# Patient Record
Sex: MALE | Race: WHITE | NOT HISPANIC OR LATINO | Employment: OTHER | URBAN - METROPOLITAN AREA
[De-identification: names, ages, dates, MRNs, and addresses within clinical notes are randomized per-mention and may not be internally consistent; named-entity substitution may affect disease eponyms.]

---

## 2023-04-25 LAB
ALANINE AMINOTRANSFERASE (SGPT) (U/L) IN SER/PLAS: 22 U/L (ref 10–52)
ANION GAP IN SER/PLAS: 12 MMOL/L (ref 10–20)
CALCIUM (MG/DL) IN SER/PLAS: 10.2 MG/DL (ref 8.6–10.3)
CARBON DIOXIDE, TOTAL (MMOL/L) IN SER/PLAS: 30 MMOL/L (ref 21–32)
CHLORIDE (MMOL/L) IN SER/PLAS: 101 MMOL/L (ref 98–107)
CHOLESTEROL (MG/DL) IN SER/PLAS: 152 MG/DL (ref 0–199)
CHOLESTEROL IN HDL (MG/DL) IN SER/PLAS: 52.3 MG/DL
CHOLESTEROL/HDL RATIO: 2.9
CREATININE (MG/DL) IN SER/PLAS: 0.85 MG/DL (ref 0.5–1.3)
GFR MALE: >90 ML/MIN/1.73M2
GLUCOSE (MG/DL) IN SER/PLAS: 86 MG/DL (ref 74–99)
LDL: 80 MG/DL (ref 0–99)
POTASSIUM (MMOL/L) IN SER/PLAS: 4.4 MMOL/L (ref 3.5–5.3)
PROSTATE SPECIFIC AG (NG/ML) IN SER/PLAS: 6.09 NG/ML (ref 0–4)
SODIUM (MMOL/L) IN SER/PLAS: 139 MMOL/L (ref 136–145)
THYROTROPIN (MIU/L) IN SER/PLAS BY DETECTION LIMIT <= 0.05 MIU/L: 2.35 MIU/L (ref 0.44–3.98)
TRIGLYCERIDE (MG/DL) IN SER/PLAS: 97 MG/DL (ref 0–149)
UREA NITROGEN (MG/DL) IN SER/PLAS: 15 MG/DL (ref 6–23)
VLDL: 19 MG/DL (ref 0–40)

## 2023-04-27 NOTE — RESULT ENCOUNTER NOTE
Please call- Please let him know his labs all look stable.  PSA again a bit bumped- but improved a bit from last time.  Please be sure to keep upcoming appointment with Dr. Ahmadi.

## 2023-05-01 PROBLEM — R39.11 HESITANCY OF MICTURITION: Status: ACTIVE | Noted: 2023-05-01

## 2023-05-01 PROBLEM — R39.12 WEAK URINARY STREAM: Status: ACTIVE | Noted: 2023-05-01

## 2023-05-01 PROBLEM — M18.9 OSTEOARTHRITIS OF FIRST CARPOMETACARPAL JOINT, UNSPECIFIED: Status: ACTIVE | Noted: 2023-05-01

## 2023-05-01 PROBLEM — J30.1 ALLERGIC RHINITIS DUE TO POLLEN: Status: ACTIVE | Noted: 2023-05-01

## 2023-05-01 PROBLEM — E78.5 DYSLIPIDEMIA: Status: ACTIVE | Noted: 2023-05-01

## 2023-05-01 PROBLEM — G25.0 TREMOR, ESSENTIAL: Status: ACTIVE | Noted: 2023-05-01

## 2023-05-01 PROBLEM — K57.30 DIVERTICULOSIS OF COLON: Status: ACTIVE | Noted: 2023-05-01

## 2023-05-01 PROBLEM — R97.20 ELEVATED PSA: Status: ACTIVE | Noted: 2023-05-01

## 2023-05-01 PROBLEM — N40.0 BENIGN PROSTATIC HYPERPLASIA, PRESENCE OF LOWER URINARY TRACT SYMPTOMS UNSPECIFIED: Status: ACTIVE | Noted: 2023-05-01

## 2023-05-01 PROBLEM — R03.0 BLOOD PRESSURE ELEVATED WITHOUT HISTORY OF HTN: Status: ACTIVE | Noted: 2023-05-01

## 2023-05-01 PROBLEM — Z97.3 WEARS GLASSES: Status: ACTIVE | Noted: 2023-05-01

## 2023-05-01 RX ORDER — TAMSULOSIN HYDROCHLORIDE 0.4 MG/1
2 CAPSULE ORAL NIGHTLY
COMMUNITY
Start: 2018-11-12 | End: 2023-08-17

## 2023-05-01 RX ORDER — ATORVASTATIN CALCIUM 10 MG/1
TABLET, FILM COATED ORAL
COMMUNITY
Start: 2017-06-16 | End: 2023-08-17

## 2023-05-01 RX ORDER — MINERAL OIL
ENEMA (ML) RECTAL
COMMUNITY
Start: 2017-01-16

## 2023-05-01 RX ORDER — AZELASTINE HCL 205.5 UG/1
SPRAY NASAL
COMMUNITY
Start: 2017-10-06 | End: 2024-05-20 | Stop reason: WASHOUT

## 2023-05-01 RX ORDER — FLUTICASONE FUROATE 27.5 UG/1
SPRAY, METERED NASAL
COMMUNITY
Start: 2019-03-25

## 2023-05-01 RX ORDER — CHOLECALCIFEROL (VITAMIN D3) 25 MCG
1 TABLET,CHEWABLE ORAL AS NEEDED
COMMUNITY
Start: 2016-05-19

## 2023-05-05 ENCOUNTER — OFFICE VISIT (OUTPATIENT)
Dept: PRIMARY CARE | Facility: CLINIC | Age: 73
End: 2023-05-05
Payer: MEDICARE

## 2023-05-05 VITALS
TEMPERATURE: 98.1 F | SYSTOLIC BLOOD PRESSURE: 124 MMHG | RESPIRATION RATE: 16 BRPM | BODY MASS INDEX: 30.37 KG/M2 | DIASTOLIC BLOOD PRESSURE: 84 MMHG | WEIGHT: 196.8 LBS | OXYGEN SATURATION: 95 % | HEART RATE: 64 BPM

## 2023-05-05 DIAGNOSIS — E78.5 DYSLIPIDEMIA, GOAL LDL BELOW 100: Chronic | ICD-10-CM

## 2023-05-05 DIAGNOSIS — M17.0 ARTHRITIS OF BOTH KNEES: Chronic | ICD-10-CM

## 2023-05-05 DIAGNOSIS — R03.0 BLOOD PRESSURE ELEVATED WITHOUT HISTORY OF HTN: Chronic | ICD-10-CM

## 2023-05-05 DIAGNOSIS — E66.9 CLASS 1 OBESITY WITH SERIOUS COMORBIDITY AND BODY MASS INDEX (BMI) OF 30.0 TO 30.9 IN ADULT, UNSPECIFIED OBESITY TYPE: Chronic | ICD-10-CM

## 2023-05-05 DIAGNOSIS — M46.90 INFLAMMATORY SPONDYLOPATHY, UNSPECIFIED SPINAL REGION (CMS-HCC): Primary | Chronic | ICD-10-CM

## 2023-05-05 DIAGNOSIS — N40.0 BENIGN PROSTATIC HYPERPLASIA, PRESENCE OF LOWER URINARY TRACT SYMPTOMS UNSPECIFIED: Chronic | ICD-10-CM

## 2023-05-05 DIAGNOSIS — N52.9 IMPOTENCE OF ORGANIC ORIGIN: Chronic | ICD-10-CM

## 2023-05-05 DIAGNOSIS — Z71.85 IMMUNIZATION COUNSELING: Chronic | ICD-10-CM

## 2023-05-05 PROCEDURE — 1036F TOBACCO NON-USER: CPT | Performed by: INTERNAL MEDICINE

## 2023-05-05 PROCEDURE — 3008F BODY MASS INDEX DOCD: CPT | Performed by: INTERNAL MEDICINE

## 2023-05-05 PROCEDURE — 99214 OFFICE O/P EST MOD 30 MIN: CPT | Performed by: INTERNAL MEDICINE

## 2023-05-05 PROCEDURE — 1160F RVW MEDS BY RX/DR IN RCRD: CPT | Performed by: INTERNAL MEDICINE

## 2023-05-05 PROCEDURE — 1159F MED LIST DOCD IN RCRD: CPT | Performed by: INTERNAL MEDICINE

## 2023-05-05 RX ORDER — ACETAMINOPHEN 500 MG
500 TABLET ORAL
COMMUNITY
End: 2024-06-10 | Stop reason: HOSPADM

## 2023-05-05 RX ORDER — LORATADINE 10 MG/1
10 TABLET ORAL
COMMUNITY
Start: 2011-08-24 | End: 2023-10-11 | Stop reason: ALTCHOICE

## 2023-05-05 RX ORDER — KETOTIFEN FUMARATE 0.35 MG/ML
1 SOLUTION/ DROPS OPHTHALMIC 2 TIMES DAILY
COMMUNITY
Start: 2011-08-24 | End: 2023-05-05 | Stop reason: ALTCHOICE

## 2023-05-05 RX ORDER — GLUC/MSM/COLGN2/HYAL/ANTIARTH3 375-375-20
1 TABLET ORAL DAILY
COMMUNITY
Start: 2011-06-24

## 2023-05-05 ASSESSMENT — PATIENT HEALTH QUESTIONNAIRE - PHQ9
SUM OF ALL RESPONSES TO PHQ9 QUESTIONS 1 AND 2: 0
2. FEELING DOWN, DEPRESSED OR HOPELESS: NOT AT ALL
1. LITTLE INTEREST OR PLEASURE IN DOING THINGS: NOT AT ALL

## 2023-05-05 ASSESSMENT — ENCOUNTER SYMPTOMS
DEPRESSION: 0
LOSS OF SENSATION IN FEET: 0
OCCASIONAL FEELINGS OF UNSTEADINESS: 0

## 2023-05-05 NOTE — PROGRESS NOTES
Subjective   Patient ID: Robe Fernandez is a 72 y.o. male who presents for Follow-up.    Here for follow up  Just did labs.       Review of Systems    Objective   /84   Pulse 64   Temp 36.7 °C (98.1 °F)   Resp 16   Wt 89.3 kg (196 lb 12.8 oz)   SpO2 95%   BMI 30.37 kg/m²     Physical Exam  Vitals reviewed.   Constitutional:       Appearance: Normal appearance.   HENT:      Head: Normocephalic and atraumatic.   Eyes:      General: No scleral icterus.        Right eye: No discharge.         Left eye: No discharge.      Extraocular Movements: Extraocular movements intact.      Conjunctiva/sclera: Conjunctivae normal.      Pupils: Pupils are equal, round, and reactive to light.   Cardiovascular:      Rate and Rhythm: Normal rate and regular rhythm.      Pulses: Normal pulses.      Heart sounds: Normal heart sounds. No murmur heard.  Pulmonary:      Effort: Pulmonary effort is normal.      Breath sounds: Normal breath sounds. No wheezing or rhonchi.   Musculoskeletal:         General: No deformity or signs of injury. Normal range of motion.      Cervical back: Normal range of motion and neck supple. No rigidity or tenderness.   Lymphadenopathy:      Cervical: No cervical adenopathy.   Skin:     General: Skin is warm and dry.      Findings: No rash.   Neurological:      General: No focal deficit present.      Mental Status: He is alert and oriented to person, place, and time. Mental status is at baseline.      Cranial Nerves: No cranial nerve deficit.      Sensory: No sensory deficit.      Gait: Gait normal.   Psychiatric:         Mood and Affect: Mood normal.         Behavior: Behavior normal.         Thought Content: Thought content normal.         Judgment: Judgment normal.       Assessment/Plan   Problem List Items Addressed This Visit          Circulatory    Blood pressure elevated without history of HTN    Relevant Orders    CT cardiac scoring wo IV contrast       Genitourinary    Benign prostatic  hyperplasia, presence of lower urinary tract symptoms unspecified    Impotence of organic origin       Musculoskeletal    Inflammatory spondylopathy, unspecified spinal region (CMS/HCC) - Primary    Arthritis of both knees       Other    Dyslipidemia, goal LDL below 100    Relevant Orders    CT cardiac scoring wo IV contrast     Other Visit Diagnoses       Immunization counseling  (Chronic)       Relevant Medications    zoster vaccine-recombinant adjuvanted (Shingrix) 50 mcg/0.5 mL vaccine    zoster vaccine-recombinant adjuvanted (Shingrix) 50 mcg/0.5 mL vaccine    diphth,pertus,acell,,tetanus (BoostRIX) 2.5-8-5 Lf-mcg-Lf/0.5mL injection    Class 1 obesity with serious comorbidity and body mass index (BMI) of 30.0 to 30.9 in adult, unspecified obesity type  (Chronic)                    Borderline elevated blood pressure- diastolic blood pressure improved on recheck     Dyslipidemia/elevated weight recent lipids reviewed. Elevated 10 year cardiac risk assessment 12% in June 2017- he will continue a baby aspirin daily and atorvastatin and reassess before follow-up. He understands the need for long-term weight loss efforts.             Goal LDL under 100.  LDL April 2023 53.  Discussed further cardiac screening evaluation-suggested calcium score 5/23      Exercise routine-he understands the importance of regular exercise    History of Dizziness-this occurs when he does 1 machine with his  twice weekly with weight training. I encouraged him not to use equipment that causes dizziness   Not actively problematic presently     Seasonal allergies/ recurrent sinus issues- mainly spring issue- better now; saw [facility] ENT MD, and sinus polyp surgery not deemed necessary now. Saw Dr. Whitman - who started shots mainly for molds & weeds- now weekly. He'll work a plan for winter in [place]. He will get back with her accordingly and continue medications              So far doing fairly well this spring despite the heavy pollen  counts in [place]. Flonase Sensimist helpful     Colon polyps / History of occasional Loose bowel movements over the last week/history of diverticulosis/family history of colon cancer - father / external hemorrhoids-he will continue his probiotic and healthy diet choices    colonoscopy updated June '21; next in 5 yrs - June 2026    Ext. Hemorrhoids - he'll cont. Fluids, fiber and desitin prn     Elevated PSA / BPH / family Hx prostate cancer - both brothers-           [place] urology evaluation included MRI which was negative.  12/27/21.   prostate surgery was planned late July 2022- HoLEP with Dr. Perez- but elected to defer surgery            April 2023 PSA elevated 6.09.  Suggested he establish with either his [place] urologist, Dr. Perez, or his [place] urologist, determine if or whether further evaluation needs to be considered with his strong family history of prostate cancer in both brothers.     Intention tremor - occas right hand w/ tremor w/ writing. He'll follow     Right lateral epicondylitis- information provided, review two-page handout, he will do stretching and use the Velcro elbow brace and follow-up with concerns. Not problematic     Bilateral CMC DJD- he will continue Tylenol or ibuprofen, caution with overactivity and follow-up with his orthopedist at the [facility] as needed; x-ray suggested mild issue, but clinically occas sore; doing well     History of bilateral partial knee replacement-stable symptoms presently. He will see his orthopedist- Dr. Aaron / [facility] as needed. Caution with overactivity and again Tylenol and/or ibuprofen as needed. He also uses glucosamine. So far doing well     Right hip pain-occasionally noted this does not really cause any issues            Mainly flares w/ more frequent exercise or hiking     Metatarsalgia- he has rather high arches and sometimes has pain along his lateral feet. He will continue to optimize shoes accordingly        Skin care-he understands importance of  sunscreen and dermatology follow-up with concerns. Presently on doxycycline for presumed rosacea. Visits annually. Several spots removed.     Dental care-encouraged semiannual dental visits- UTD     Glasses / Vision care-he continues regularly- He will set this up soon.            Cataracts advancing. He'll cont. Each spring here in [place]        Discussed Shingrix / new shingles shot - 2 shot series w/ limited availability. Encouraged patient to consider getting this when/ where available.      Flu shot encouraged each fall     Follow-up in 6 months, sooner as needed- after returning from [place]

## 2023-08-16 DIAGNOSIS — N40.0 BENIGN PROSTATIC HYPERPLASIA, PRESENCE OF LOWER URINARY TRACT SYMPTOMS UNSPECIFIED: Primary | ICD-10-CM

## 2023-08-16 DIAGNOSIS — E78.5 DYSLIPIDEMIA, GOAL LDL BELOW 100: ICD-10-CM

## 2023-08-17 RX ORDER — ATORVASTATIN CALCIUM 10 MG/1
10 TABLET, FILM COATED ORAL DAILY
Qty: 90 TABLET | Refills: 3 | Status: SHIPPED | OUTPATIENT
Start: 2023-08-17 | End: 2023-10-11 | Stop reason: SDUPTHER

## 2023-08-17 RX ORDER — TAMSULOSIN HYDROCHLORIDE 0.4 MG/1
0.8 CAPSULE ORAL NIGHTLY
Qty: 180 CAPSULE | Refills: 3 | Status: SHIPPED | OUTPATIENT
Start: 2023-08-17 | End: 2024-06-10 | Stop reason: HOSPADM

## 2023-08-30 LAB — PROSTATE SPECIFIC AG (NG/ML) IN SER/PLAS: 5.22 NG/ML (ref 0–4)

## 2023-08-31 ENCOUNTER — TELEPHONE (OUTPATIENT)
Dept: PRIMARY CARE | Facility: CLINIC | Age: 73
End: 2023-08-31
Payer: MEDICARE

## 2023-08-31 DIAGNOSIS — R93.1 AGATSTON CORONARY ARTERY CALCIUM SCORE GREATER THAN 400: Primary | ICD-10-CM

## 2023-08-31 NOTE — TELEPHONE ENCOUNTER
Called and spoke with patient regarding his elevated calcium score at 1673.  He will set up an appointment with Dr. Perez in cardiology.    He has his blood pressure has been better at home in the 120 range systolic.  We will continue to follow this and review this with Dr. Perez

## 2023-08-31 NOTE — RESULT ENCOUNTER NOTE
Robe-thanks for doing the calcium score.  I am concerned that the calcium score is elevated.  I we will plan to call you this afternoon as we will need to get you scheduled to see the cardiologist to look into this matter in further detail.    Thanks again for doing this important cardiac test.    Sincerely,  Berto Ahmadi MD

## 2023-10-11 ENCOUNTER — OFFICE VISIT (OUTPATIENT)
Dept: CARDIOLOGY | Facility: CLINIC | Age: 73
End: 2023-10-11
Payer: MEDICARE

## 2023-10-11 VITALS
HEART RATE: 93 BPM | WEIGHT: 189 LBS | SYSTOLIC BLOOD PRESSURE: 136 MMHG | OXYGEN SATURATION: 93 % | BODY MASS INDEX: 27.06 KG/M2 | HEIGHT: 70 IN | DIASTOLIC BLOOD PRESSURE: 90 MMHG

## 2023-10-11 DIAGNOSIS — R03.0 BLOOD PRESSURE ELEVATED WITHOUT HISTORY OF HTN: ICD-10-CM

## 2023-10-11 DIAGNOSIS — E78.5 DYSLIPIDEMIA, GOAL LDL BELOW 100: Primary | ICD-10-CM

## 2023-10-11 DIAGNOSIS — R93.1 AGATSTON CORONARY ARTERY CALCIUM SCORE GREATER THAN 400: ICD-10-CM

## 2023-10-11 PROCEDURE — 93005 ELECTROCARDIOGRAM TRACING: CPT | Mod: PO | Performed by: INTERNAL MEDICINE

## 2023-10-11 PROCEDURE — 99214 OFFICE O/P EST MOD 30 MIN: CPT | Mod: PO | Performed by: INTERNAL MEDICINE

## 2023-10-11 PROCEDURE — 99204 OFFICE O/P NEW MOD 45 MIN: CPT | Performed by: INTERNAL MEDICINE

## 2023-10-11 PROCEDURE — 1159F MED LIST DOCD IN RCRD: CPT | Performed by: INTERNAL MEDICINE

## 2023-10-11 PROCEDURE — 1126F AMNT PAIN NOTED NONE PRSNT: CPT | Performed by: INTERNAL MEDICINE

## 2023-10-11 PROCEDURE — 1160F RVW MEDS BY RX/DR IN RCRD: CPT | Performed by: INTERNAL MEDICINE

## 2023-10-11 PROCEDURE — 3008F BODY MASS INDEX DOCD: CPT | Performed by: INTERNAL MEDICINE

## 2023-10-11 PROCEDURE — 1036F TOBACCO NON-USER: CPT | Performed by: INTERNAL MEDICINE

## 2023-10-11 RX ORDER — ATORVASTATIN CALCIUM 10 MG/1
40 TABLET, FILM COATED ORAL NIGHTLY
Qty: 360 TABLET | Refills: 3 | Status: SHIPPED | OUTPATIENT
Start: 2023-10-11 | End: 2023-10-12 | Stop reason: SDUPTHER

## 2023-10-11 ASSESSMENT — PAIN SCALES - GENERAL: PAINLEVEL: 0-NO PAIN

## 2023-10-11 NOTE — PROGRESS NOTES
Name : Robe Fernandez    : 1950   MRN : [id number]   ENC Date : 10/11/23     Reason for visit: Abnormal coronary calcium score    Assessment and Plan:  Elevated coronary calcium score: Coronary calcium score is quite elevated and places him in the high risk category for cardiovascular events over the next decade.  He is currently completely asymptomatic exercising multiple times per week.  Therefore I do not think stress testing is warranted.  I recommended increasing his atorvastatin to 40 mg daily.  We had a long discussion regarding utility of aspirin for primary prevention.  I think given his family history and the significantly elevated coronary calcium score I would recommend aspirin 81 mg daily.  He is going to think about this and determine whether he wants to started or not.  If his symptoms change in the future then certainly moving ahead with stress testing would be warranted.  Family history abdominal aortic aneurysm: Father.  Passed away from a ruptured abdominal aortic aneurysm.  This was not confirmed by autopsy.  However the story is good enough to warrant screening for abdominal aortic aneurysm.  We will schedule this and call him with the result.  Elevated blood pressure at the time of office visits well controlled at home: Patient does a good job of checking his blood pressure at home.  His diastolic is high normal today but his systolic is reasonable.  Continue to clinically follow.  Dyslipidemia: As above  Disp: Phone follow-up with abdominal aortic aneurysm screening ultrasound test.  If normal can return as needed      HPI:  Patient is seen today for evaluation of abnormal coronary calcium score.  His coronary calcium score is greater than 1400.  Patient is asymptomatic.  He exercises 2-3 times per week with no chest pain tightness pressure or squeezing.  He has mild dyspnea at peak exertion but has no other dyspnea with normal activities.  No syncopal events.  No TIA or CVA-like symptoms.   He has a family history of a father who had a TIA and ultimately a stroke as well.  He likely however passed away from a ruptured abdominal aortic aneurysm.  This was not confirmed by autopsy but this was clinically felt to be the most likely cause of his death.  We will discuss      Problem List:   Patient Active Problem List   Diagnosis    Allergic rhinitis due to pollen    Benign prostatic hyperplasia, presence of lower urinary tract symptoms unspecified    Blood pressure elevated without history of HTN    Diverticulosis of colon    Dyslipidemia, goal LDL below 100    Elevated PSA    Hesitancy of micturition    Osteoarthritis of first carpometacarpal joint, unspecified    Tremor, essential    Weak urinary stream    Wears glasses    Inflammatory spondylopathy, unspecified spinal region (CMS/HCC)    Arthritis of both knees    Impotence of organic origin    Agatston coronary artery calcium score greater than 400        Meds:   Current Outpatient Medications on File Prior to Visit   Medication Sig Dispense Refill    acetaminophen (Tylenol) 500 mg tablet Take 1 tablet (500 mg) by mouth.      atorvastatin (Lipitor) 10 mg tablet Take 1 tablet (10 mg) by mouth once daily. FOR HIGH CHOLESTEROL 90 tablet 3    azelastine 205.5 mcg (0.15 %) spray,non-aerosol Administer into affected nostril(s).      fexofenadine (Allegra) 180 mg tablet Take by mouth.      fluticasone (Flonase Sensimist) 27.5 mcg/actuation nasal spray Administer into affected nostril(s).      glucosamine sanchez 2KCl-chondroit 500-400 mg tablet Take 1 tablet by mouth 3 times a day.      Lactobacillus acidophilus 500 million cell tablet Take by mouth.      tamsulosin (Flomax) 0.4 mg 24 hr capsule Take 2 capsules (0.8 mg) by mouth once daily at bedtime. 180 capsule 3    vitamins A,C,E-zinc-copper (ICaps AREDS) 7,160-113-100 unit-mg-unit tablet,delayed release (DR/EC) Take by mouth.      [DISCONTINUED] loratadine (Claritin) 10 mg tablet Take 1 tablet (10 mg) by mouth  "once daily.      [DISCONTINUED] zoster vaccine-recombinant adjuvanted (Shingrix) 50 mcg/0.5 mL vaccine Give 1 injection, 0.5 ml IM now, and repeat in 8 weeks 0.5 mL 1    [DISCONTINUED] zoster vaccine-recombinant adjuvanted (Shingrix) 50 mcg/0.5 mL vaccine Give 1 injection, 0.5 ml IM now, and repeat in 8 weeks 0.5 mL 1     No current facility-administered medications on file prior to visit.       All: No Known Allergies    Fam Hx:   Family History   Problem Relation Name Age of Onset    Transient ischemic attack Father      Other (cva) Father      Aortic aneurysm Father         Soc Hx:   Social History     Socioeconomic History    Marital status: Significant Other     Spouse name: Not on file    Number of children: Not on file    Years of education: Not on file    Highest education level: Not on file   Occupational History    Not on file   Tobacco Use    Smoking status: Never    Smokeless tobacco: Never   Vaping Use    Vaping Use: Never used   Substance and Sexual Activity    Alcohol use: Yes     Comment: Rarely    Drug use: Never    Sexual activity: Not on file   Other Topics Concern    Not on file   Social History Narrative    Not on file     Social Determinants of Health     Financial Resource Strain: Not on file   Food Insecurity: Not on file   Transportation Needs: Not on file   Physical Activity: Not on file   Stress: Not on file   Social Connections: Not on file   Intimate Partner Violence: Not on file   Housing Stability: Not on file       ROS    VS: /90 (BP Location: Right arm, Patient Position: Sitting, BP Cuff Size: Adult)   Pulse 93   Ht 1.778 m (5' 10\")   Wt 85.7 kg (189 lb)   SpO2 93%   BMI 27.12 kg/m²      Physical Exam  Vitals reviewed.   Constitutional:       Appearance: Normal appearance.   Eyes:      Pupils: Pupils are equal, round, and reactive to light.   Neck:      Vascular: No JVD.   Cardiovascular:      Rate and Rhythm: Normal rate and regular rhythm.      Pulses: Normal pulses.     "  Heart sounds: No murmur heard.     No gallop.   Pulmonary:      Effort: No respiratory distress.      Breath sounds: No wheezing or rales.   Abdominal:      General: Abdomen is flat. There is no distension.      Palpations: Abdomen is soft.   Musculoskeletal:         General: No swelling.      Right lower leg: No edema.      Left lower leg: No edema.   Neurological:      General: No focal deficit present.      Mental Status: He is alert.   Psychiatric:         Mood and Affect: Mood normal.              Jun Perez MD

## 2023-10-12 ENCOUNTER — TELEPHONE (OUTPATIENT)
Dept: CARDIOLOGY | Facility: CLINIC | Age: 73
End: 2023-10-12
Payer: MEDICARE

## 2023-10-12 DIAGNOSIS — E78.5 DYSLIPIDEMIA, GOAL LDL BELOW 100: ICD-10-CM

## 2023-10-12 LAB
ATRIAL RATE: 86 BPM
P AXIS: 49 DEGREES
P OFFSET: 201 MS
P ONSET: 142 MS
PR INTERVAL: 162 MS
Q ONSET: 223 MS
QRS COUNT: 14 BEATS
QRS DURATION: 96 MS
QT INTERVAL: 330 MS
QTC CALCULATION(BAZETT): 394 MS
QTC FREDERICIA: 372 MS
R AXIS: 27 DEGREES
T AXIS: 69 DEGREES
T OFFSET: 388 MS
VENTRICULAR RATE: 86 BPM

## 2023-10-12 PROCEDURE — 93010 ELECTROCARDIOGRAM REPORT: CPT | Performed by: INTERNAL MEDICINE

## 2023-10-12 RX ORDER — ATORVASTATIN CALCIUM 40 MG/1
40 TABLET, FILM COATED ORAL NIGHTLY
Qty: 90 TABLET | Refills: 3 | Status: SHIPPED | OUTPATIENT
Start: 2023-10-12 | End: 2024-10-11

## 2023-10-12 NOTE — TELEPHONE ENCOUNTER
PT needs an auth on his [employer], He sd you had it incr' to 40mg, and he also noticed that it was @10mg. Please advise

## 2023-10-31 ENCOUNTER — HOSPITAL ENCOUNTER (OUTPATIENT)
Dept: VASCULAR MEDICINE | Facility: CLINIC | Age: 73
Discharge: HOME | End: 2023-10-31
Payer: MEDICARE

## 2023-10-31 DIAGNOSIS — R93.1 AGATSTON CORONARY ARTERY CALCIUM SCORE GREATER THAN 400: ICD-10-CM

## 2023-10-31 DIAGNOSIS — Z13.6 ENCOUNTER FOR SCREENING FOR CARDIOVASCULAR DISORDERS: ICD-10-CM

## 2023-10-31 DIAGNOSIS — E78.5 DYSLIPIDEMIA, GOAL LDL BELOW 100: ICD-10-CM

## 2023-10-31 DIAGNOSIS — R03.0 BLOOD PRESSURE ELEVATED WITHOUT HISTORY OF HTN: ICD-10-CM

## 2023-10-31 PROCEDURE — 76706 US ABDL AORTA SCREEN AAA: CPT

## 2023-10-31 PROCEDURE — 76706 US ABDL AORTA SCREEN AAA: CPT | Performed by: SURGERY

## 2023-11-02 ENCOUNTER — TELEPHONE (OUTPATIENT)
Dept: CARDIOLOGY | Facility: HOSPITAL | Age: 73
End: 2023-11-02

## 2023-11-02 NOTE — TELEPHONE ENCOUNTER
Please let patient know his abdominal ultrasound shows no evidence of abdominal aortic aneurysm.  No further screening is needed.    SDH

## 2023-11-07 ENCOUNTER — ANCILLARY PROCEDURE (OUTPATIENT)
Dept: RADIOLOGY | Facility: CLINIC | Age: 73
End: 2023-11-07
Payer: MEDICARE

## 2023-11-07 ENCOUNTER — TELEPHONE (OUTPATIENT)
Dept: PRIMARY CARE | Facility: CLINIC | Age: 73
End: 2023-11-07
Payer: MEDICARE

## 2023-11-07 DIAGNOSIS — M54.30 SCIATIC NERVE PAIN, UNSPECIFIED LATERALITY: ICD-10-CM

## 2023-11-07 DIAGNOSIS — M54.30 SCIATIC NERVE PAIN, UNSPECIFIED LATERALITY: Primary | ICD-10-CM

## 2023-11-07 PROCEDURE — 72100 X-RAY EXAM L-S SPINE 2/3 VWS: CPT | Performed by: STUDENT IN AN ORGANIZED HEALTH CARE EDUCATION/TRAINING PROGRAM

## 2023-11-07 PROCEDURE — 72100 X-RAY EXAM L-S SPINE 2/3 VWS: CPT

## 2023-11-07 NOTE — TELEPHONE ENCOUNTER
Patient was seen in ER for back pain now has sciatica is on a steroid asking if there is something else that can be done ? Has appointment tomorrow for physical.    Please call [phone]

## 2023-11-08 ENCOUNTER — OFFICE VISIT (OUTPATIENT)
Dept: PRIMARY CARE | Facility: CLINIC | Age: 73
End: 2023-11-08
Payer: MEDICARE

## 2023-11-08 VITALS
RESPIRATION RATE: 16 BRPM | DIASTOLIC BLOOD PRESSURE: 85 MMHG | SYSTOLIC BLOOD PRESSURE: 128 MMHG | HEIGHT: 70 IN | OXYGEN SATURATION: 94 % | TEMPERATURE: 97.9 F | HEART RATE: 89 BPM | BODY MASS INDEX: 26.63 KG/M2 | WEIGHT: 186 LBS

## 2023-11-08 DIAGNOSIS — M53.3 CHRONIC RIGHT SI JOINT PAIN: ICD-10-CM

## 2023-11-08 DIAGNOSIS — Z00.00 ROUTINE GENERAL MEDICAL EXAMINATION AT HEALTH CARE FACILITY: Primary | ICD-10-CM

## 2023-11-08 DIAGNOSIS — Z23 NEED FOR INFLUENZA VACCINATION: ICD-10-CM

## 2023-11-08 DIAGNOSIS — Z71.85 IMMUNIZATION COUNSELING: ICD-10-CM

## 2023-11-08 DIAGNOSIS — M54.31 SCIATICA OF RIGHT SIDE: ICD-10-CM

## 2023-11-08 DIAGNOSIS — G89.29 CHRONIC RIGHT SI JOINT PAIN: ICD-10-CM

## 2023-11-08 DIAGNOSIS — E78.5 DYSLIPIDEMIA, GOAL LDL BELOW 100: ICD-10-CM

## 2023-11-08 DIAGNOSIS — R03.0 BLOOD PRESSURE ELEVATED WITHOUT HISTORY OF HTN: ICD-10-CM

## 2023-11-08 PROCEDURE — 1036F TOBACCO NON-USER: CPT | Performed by: INTERNAL MEDICINE

## 2023-11-08 PROCEDURE — 1160F RVW MEDS BY RX/DR IN RCRD: CPT | Performed by: INTERNAL MEDICINE

## 2023-11-08 PROCEDURE — 1170F FXNL STATUS ASSESSED: CPT | Performed by: INTERNAL MEDICINE

## 2023-11-08 PROCEDURE — 1159F MED LIST DOCD IN RCRD: CPT | Performed by: INTERNAL MEDICINE

## 2023-11-08 PROCEDURE — G0008 ADMIN INFLUENZA VIRUS VAC: HCPCS | Performed by: INTERNAL MEDICINE

## 2023-11-08 PROCEDURE — 1126F AMNT PAIN NOTED NONE PRSNT: CPT | Performed by: INTERNAL MEDICINE

## 2023-11-08 PROCEDURE — 99214 OFFICE O/P EST MOD 30 MIN: CPT | Performed by: INTERNAL MEDICINE

## 2023-11-08 PROCEDURE — G0439 PPPS, SUBSEQ VISIT: HCPCS | Performed by: INTERNAL MEDICINE

## 2023-11-08 PROCEDURE — 90662 IIV NO PRSV INCREASED AG IM: CPT | Performed by: INTERNAL MEDICINE

## 2023-11-08 PROCEDURE — G0444 DEPRESSION SCREEN ANNUAL: HCPCS | Performed by: INTERNAL MEDICINE

## 2023-11-08 PROCEDURE — 3008F BODY MASS INDEX DOCD: CPT | Performed by: INTERNAL MEDICINE

## 2023-11-08 RX ORDER — CYCLOBENZAPRINE HCL 10 MG
TABLET ORAL
COMMUNITY
Start: 2023-11-04 | End: 2023-11-21 | Stop reason: SDUPTHER

## 2023-11-08 RX ORDER — METHYLPREDNISOLONE 4 MG/1
TABLET ORAL
COMMUNITY
Start: 2023-11-04 | End: 2023-11-21 | Stop reason: ALTCHOICE

## 2023-11-08 ASSESSMENT — ACTIVITIES OF DAILY LIVING (ADL)
TAKING_MEDICATION: INDEPENDENT
BATHING: INDEPENDENT
MANAGING_FINANCES: INDEPENDENT
DRESSING: INDEPENDENT
DOING_HOUSEWORK: INDEPENDENT
GROCERY_SHOPPING: INDEPENDENT

## 2023-11-08 ASSESSMENT — ENCOUNTER SYMPTOMS
OCCASIONAL FEELINGS OF UNSTEADINESS: 1
DEPRESSION: 0
LOSS OF SENSATION IN FEET: 0

## 2023-11-08 ASSESSMENT — PATIENT HEALTH QUESTIONNAIRE - PHQ9
1. LITTLE INTEREST OR PLEASURE IN DOING THINGS: NOT AT ALL
SUM OF ALL RESPONSES TO PHQ9 QUESTIONS 1 AND 2: 0
2. FEELING DOWN, DEPRESSED OR HOPELESS: NOT AT ALL

## 2023-11-08 NOTE — PROGRESS NOTES
"Subjective   Reason for Visit: Robe Fernandez is an 73 y.o. male here for a Medicare Wellness visit.     Past Medical, Surgical, and Family History reviewed and updated in chart.         Here for follow-up and wellness visit.  Overall doing fairly well.  However within the last week or so he has developed pain right lower back radiating down his right thigh towards his right knee.  No recent trauma no injury    Planning to travel to [place] for the winter after Thanksgiving    Right shoulder occasionally sore        Patient Care Team:  Berto Ahmadi MD as PCP - General  Berto Ahmadi MD as PCP - [facility] Attributed Provider     Review of Systems    Objective   Vitals:  /85 (BP Location: Left arm, Patient Position: Sitting, BP Cuff Size: Adult)   Pulse 89   Temp 36.6 °C (97.9 °F)   Resp 16   Ht 1.778 m (5' 10\")   Wt 84.4 kg (186 lb)   SpO2 94%   BMI 26.69 kg/m²       Physical Exam  Vitals reviewed.   Constitutional:       Appearance: Normal appearance.   HENT:      Head: Normocephalic and atraumatic.   Eyes:      General: No scleral icterus.        Right eye: No discharge.         Left eye: No discharge.      Extraocular Movements: Extraocular movements intact.      Conjunctiva/sclera: Conjunctivae normal.      Pupils: Pupils are equal, round, and reactive to light.   Cardiovascular:      Rate and Rhythm: Normal rate and regular rhythm.      Pulses: Normal pulses.      Heart sounds: Normal heart sounds. No murmur heard.  Pulmonary:      Effort: Pulmonary effort is normal.      Breath sounds: Normal breath sounds. No wheezing or rhonchi.   Musculoskeletal:         General: No deformity or signs of injury. Normal range of motion.      Cervical back: Normal range of motion and neck supple. No rigidity or tenderness.   Lymphadenopathy:      Cervical: No cervical adenopathy.   Skin:     General: Skin is warm and dry.      Findings: No rash.   Neurological:      General: No focal deficit present.      Mental " Status: He is alert and oriented to person, place, and time. Mental status is at baseline.      Cranial Nerves: No cranial nerve deficit.      Sensory: No sensory deficit.      Gait: Gait normal.   Psychiatric:         Mood and Affect: Mood normal.         Behavior: Behavior normal.         Thought Content: Thought content normal.         Judgment: Judgment normal.         Assessment/Plan   Problem List Items Addressed This Visit       Blood pressure elevated without history of HTN    Relevant Orders    Basic Metabolic Panel    Dyslipidemia, goal LDL below 100    Relevant Orders    TSH with reflex to Free T4 if abnormal    Lipid Panel    Alanine Aminotransferase     Other Visit Diagnoses       Routine general medical examination at health care facility    -  Primary    Relevant Orders    1 Year Follow Up In [facility] - PCP - Wellness Exam    Sciatica of right side        Relevant Orders    Referral to Physical Therapy    Need for influenza vaccination        Relevant Orders    Flu vaccine, quadrivalent, high-dose, preservative free, age 65y+ (FLUZONE) (Completed)    Immunization counseling        Relevant Medications    zoster vaccine-recombinant adjuvanted (Shingrix) 50 mcg/0.5 mL vaccine    Chronic right SI joint pain        Relevant Orders    Referral to Physical Therapy            Home situation-he remains independent, living with his  here in [place] during the summer, and spend the winter in [place]    Right lower lumbar pain/sciatica/right sacroiliac pain-fortunately no recent trauma but clearly uncomfortable.  He received a Medrol pack in urgent care over the weekend.  He will do x-rays and pursue physical therapy.  In the meantime he will try to sleep on his back with his knees elevated avoid other positions while sitting that exacerbates such as avoiding recliner.  He will do sacroiliac stretches as well as Becky exercises      Borderline elevated blood pressure- diastolic blood pressure  improved on recheck     Dyslipidemia/elevated weight recent lipids reviewed. Elevated 10 year cardiac risk assessment 12% in June 2017- he will continue a baby aspirin daily and atorvastatin and reassess before follow-up. He understands the need for long-term weight loss efforts.             Goal LDL under 100.  LDL April 2023 53.  Discussed further cardiac screening evaluation-suggested calcium score 5/23      Exercise routine-he understands the importance of regular exercise    History of Dizziness-this occurs when he does 1 machine with his  twice weekly with weight training. I encouraged him not to use equipment that causes dizziness   Not actively problematic presently     Seasonal allergies/ recurrent sinus issues- mainly spring issue- better now; saw [facility] ENT MD, and sinus polyp surgery not deemed necessary now. Saw Dr. Whitman - who started shots mainly for molds & weeds- now weekly. He'll work a plan for winter in [place]. He will get back with her accordingly and continue medications              So far doing fairly well this spring despite the heavy pollen counts in [place]. Flonase Sensimist helpful     Colon polyps / History of occasional Loose bowel movements over the last week/history of diverticulosis/family history of colon cancer - father / external hemorrhoids-he will continue his probiotic and healthy diet choices    colonoscopy updated June '21; next in 5 yrs - June 2026    Ext. Hemorrhoids - he'll cont. Fluids, fiber and desitin prn     Elevated PSA / BPH / family Hx prostate cancer - both brothers-           [place] urology evaluation included MRI which was negative.  12/27/21.   prostate surgery was planned late July 2022- HoLEP with Dr. Perez- but elected to defer surgery            April 2023 PSA elevated 6.09.  Suggested he establish with either his [place] urologist, Dr. Perez, or his [place] urologist, determine if or whether further evaluation needs to be considered with his strong  family history of prostate cancer in both brothers.             11/23-he will continue to work with his neurologist.  His PSA was stable 8/30/2023 at 5.22    Erectile dysfunction-sildenafil prescription requested/provided     Intention tremor - occas right hand w/ tremor w/ writing. He'll follow     Right lateral epicondylitis- information provided, review two-page handout, he will do stretching and use the Velcro elbow brace and follow-up with concerns. Not problematic     Bilateral CMC DJD- he will continue Tylenol or ibuprofen, caution with overactivity and [facility]; x-ray suggested mild issue, but clinically occas sore; doing well    Right shoulder pain-occasionally noted through the years.  Fortunately not problematic     History of bilateral partial knee replacement-stable symptoms presently. He will see his orthopedist- [facility]. Caution with overactivity and again Tylenol and/or ibuprofen as needed. He also uses glucosamine. So far doing well     Right hip pain-occasionally noted this does not really cause any issues            Mainly flares w/ more frequent exercise or hiking     Metatarsalgia- he has rather high arches and sometimes has pain along his lateral feet. He will continue to optimize shoes accordingly        Skin care-he understands importance of sunscreen and dermatology follow-up with concerns. Presently on doxycycline for presumed rosacea. Visits annually. Several spots removed.     Dental care-encouraged semiannual dental visits- UTD     Glasses / Vision care-he continues regularly- He will set this up soon.            Cataracts advancing. He'll cont. Each spring here in [place]        Discussed Shingrix / new shingles shot - 2 shot series w/ limited availability. Encouraged patient to consider getting this when/ where available.      Flu shot encouraged each fall- updated 11/8/23 - today    Covid booster soon- with cruise late Nov      Follow-up in 6 months, sooner as needed- after returning from [place]

## 2023-11-09 DIAGNOSIS — N52.9 IMPOTENCE OF ORGANIC ORIGIN: Primary | ICD-10-CM

## 2023-11-09 RX ORDER — SILDENAFIL CITRATE 20 MG/1
TABLET ORAL
Qty: 75 TABLET | Refills: 11 | Status: SHIPPED | OUTPATIENT
Start: 2023-11-09

## 2023-11-09 ASSESSMENT — ACTIVITIES OF DAILY LIVING (ADL)
TOILETING: INDEPENDENT
JUDGMENT_ADEQUATE_SAFELY_COMPLETE_DAILY_ACTIVITIES: YES
PATIENT'S MEMORY ADEQUATE TO SAFELY COMPLETE DAILY ACTIVITIES?: YES
GROOMING: INDEPENDENT
FEEDING YOURSELF: INDEPENDENT
ASSISTIVE_DEVICE: EYEGLASSES
ADEQUATE_TO_COMPLETE_ADL: YES

## 2023-11-09 ASSESSMENT — PATIENT HEALTH QUESTIONNAIRE - PHQ9
2. FEELING DOWN, DEPRESSED OR HOPELESS: NOT AT ALL
SUM OF ALL RESPONSES TO PHQ9 QUESTIONS 1 AND 2: 0
1. LITTLE INTEREST OR PLEASURE IN DOING THINGS: NOT AT ALL

## 2023-11-09 NOTE — RESULT ENCOUNTER NOTE
Robe -thank you for doing the x-ray of the lumbar spine.  The radiologist does note the anterior listhesis but fortunately that is mention to be grade 1, which is mild.  More concerning is the severe degenerative arthritis at the lower L5-S1 level with degenerative disc disease.  All of these findings could lead to the sciatic symptoms that you have had.  Please continue to optimize your position posture and work with physical therapy to improve core strength.      Please let me know if the symptoms do not improve.    Thanks again for doing this x-ray.    Sincerely,  Berto Ahmadi MD

## 2023-11-13 NOTE — PROGRESS NOTES
Patient Name: Robe Fernandez  MRN: [id number]  Today's Date: 11/13/2023       Referring Diagnosis:  No diagnosis found.    Insurance:  1/ 11/13/23  Medicare (primary) 20% coins, $226 ded (met), no copay, bmn katharine yr, no PA.  [employer] (secondary) 0% coins, no ded/copay, bmn katharine yr, no PA   POC 11.15.23 -     Precautions:     ***    Assessment:  *** clinical presentation includes characteristics as noted during today's evaluation consistent with needing skilled physical therapy for ***.  Patient presents with deficits in *** including a decrease in postural awareness, strength, ROM and an increase in STR which is increasing patient's pain and limiting their ability to return to PLOF.   These findings indicate that this patient is of low complexity, and skilled PT services are warranted in order to realize measurable and meaningful change in the above outcome measures and achieve improvements in the patient's functional status and individual goals.     Plan:    Skilled PT  ***/week x  *** visits  Onset date ***  Rehab Potential ***  Patient agreeable to POC ***  Treatment may include: Therapeutic Exercise (PROM, AA/AROM, Flexibility, Strengthening, Stabilization, HEP instruction). Therapeutic Activities (Transfers, Body Mechanics, ADLs, Work Related Activities, Closed Chain, Agility and Power). Manual Techniques (Soft tissue Mobilization, Joint Mobilization/Distraction, Joint Manipulation, Muscle Energy Techniques, Lymphatic Drainage, Dry Needling). Neuromuscular Reeducation (Postural Training, Balance/Proprioception, Relaxation Techniques). Biofeedback. Aquatic Exercise. Modalities: Ultrasound, Moist Heat, Cryotherapy, Vasopneumatic with or without Cryotherapy. Electrical Stimulation (TENS/IFC/ Premodulated for pain relief, NMES for Muscle reeducation). Gait Training. Orthotic Fit and Training. Strapping, Kinesiotaping.    Subjective:  Referred by: Dr. Berto Ahmadi   M54.31 (ICD-10-CM) - Sciatica of right side   M53.3,G89.29  "(ICD-10-CM) - Chronic right SI joint pain     Initial Injury ***  Onset date ***    Pain:      Location: ***   Description: ***   Aggravating Factors: {Aggravating Factors:55802}   Relieving Factors:  {Relieving Factors:45532}    Diagnostics ***  Meds ***  Previous Treatments ***  PMHx-  Reviewed with patient and chart   Home environment ***  Occupation ***  Hobbies ***    Functional limitations   Walking ***  Standing ***  Sitting ***    Handed ***    Patients goal: ***    Objective:  (p! indicates pain)  Posture ***    Gait/Stairs ***  Bed Mobility ***  Transfers ***  Sit to stand 18\" high 5 x times in *** sec    AROM  TRUNK %  Flexion ***  Extension ***  Sidebend R ***  Sidebend L ***    HIP  Flexion R/L ***  Extension R/L***  Abduction R/L ***  Adduction R/L ***  IR R/L ***  ER R/L ***    Myotomes/Strength: #/5  L2: Hip flexion R/L:  ***  L3: Knee extension R/L:  ***  L4: Ankle dorsiflexion, inversion R/L:  ***  L5: Great toe extension R/L: ***  S1: Ankle plantarflexion and eversion R/L: ***  S2: Knee flexion R/L:  ***  Hip ABD R/L:  ***  Hip Ext R/L:  ***  Hip ADD R/L: ***    Abdominals - TA ***    Neuro(N&T) ***  Bowel/Bladder ***     Palpation***    Special Tests  SLR R/L ***  Figure 4 R/L ***  Overpressure  ***    Joint mobility/Flexibility  Hamstring 90/90 position R/L: ***  Manish test R/L:  ***    Dermatomes ***    SLS R/L *** sec      Outcome Measure:  {PT Outcome Measures:33877}     Treatment:  PT low complexity eval:   Ther ex/patient ed/HEP instruction   The patient was educated on: the importance of positioning, proper posture, and body mechanics, joint mechanics and pathology, general tissue healing time, the appropriate use of heat and cold to control pain and inflammation, the importance of general therapeutic exercise, especially to stay within pain-free ROM, specific anatomy, function, & regional interdependence of involved areas, & likely cause of impairments & POC. Pt's questions were answered " to their satisfaction, & pt. verbalized understanding & agreement with POC.  HEP    Goals:  Goals: To be achieved in ***    Patient will verbalize a decrease in pain *** in order to ***    Patient will improve flexibility, joint mobility, and AROM in *** to ***    Patient will increase strength of  *** to *** to allow the patient to perform ***    Patient to improve balance to be able to SLS ***    Patient will improve *** outcome measure score to  *** to demonstrate an overall improvement in function    Patient will improve sit to stand functional test to complete 5 reps at 18 in < 12 sec.     Patient will improve ambulation to ***    Patient will correctly perform HEP without cues to maintain progress and overall functional status and patient will be independent with strategies designed to manage symptoms     Patient's LTG -  ***    The patient and/or caregiver was involved in the creation of PT goals and patient agrees with prognosis, goals, and need to actively participate in the POC.

## 2023-11-15 ENCOUNTER — APPOINTMENT (OUTPATIENT)
Dept: PHYSICAL THERAPY | Facility: CLINIC | Age: 73
End: 2023-11-15
Payer: MEDICARE

## 2023-11-16 ENCOUNTER — APPOINTMENT (OUTPATIENT)
Dept: PHYSICAL THERAPY | Facility: CLINIC | Age: 73
End: 2023-11-16
Payer: MEDICARE

## 2023-11-21 ENCOUNTER — TELEPHONE (OUTPATIENT)
Dept: PRIMARY CARE | Facility: CLINIC | Age: 73
End: 2023-11-21
Payer: MEDICARE

## 2023-11-21 DIAGNOSIS — M53.3 CHRONIC RIGHT SI JOINT PAIN: Primary | ICD-10-CM

## 2023-11-21 DIAGNOSIS — G89.29 CHRONIC RIGHT SI JOINT PAIN: Primary | ICD-10-CM

## 2023-11-21 RX ORDER — CYCLOBENZAPRINE HCL 10 MG
TABLET ORAL
Qty: 40 TABLET | Refills: 1 | Status: SHIPPED | OUTPATIENT
Start: 2023-11-21 | End: 2024-05-23 | Stop reason: ALTCHOICE

## 2023-11-21 NOTE — TELEPHONE ENCOUNTER
Spoke with patient and his spouse.  Though he was doing better he had a flare last night.  Motrin and muscle relaxer seem to help today a bit.  Tentatively scheduled to go to [place] later this week.  Was not able to get into physical therapy as he was slightly late for the appointment and the therapist left.  At this point I encouraged the ibuprofen and muscle relaxer-cyclobenzaprine-refilled.  He will watch for drowsiness.  Encouraged heating pad and stretching as outlined at last visit and getting into physical therapy in [place].  He will call with additional concerns

## 2023-11-21 NOTE — TELEPHONE ENCOUNTER
Patient's has back pain again today. It had cleared up but is flaring up again  Asking if something can be called in     [phone]

## 2024-05-09 ENCOUNTER — OFFICE VISIT (OUTPATIENT)
Dept: UROLOGY | Facility: HOSPITAL | Age: 74
End: 2024-05-09
Payer: MEDICARE

## 2024-05-09 ENCOUNTER — PREP FOR PROCEDURE (OUTPATIENT)
Dept: UROLOGY | Facility: HOSPITAL | Age: 74
End: 2024-05-09

## 2024-05-09 DIAGNOSIS — R39.12 WEAK URINARY STREAM: ICD-10-CM

## 2024-05-09 DIAGNOSIS — N40.1 ENLARGED PROSTATE WITH URINARY OBSTRUCTION: Primary | ICD-10-CM

## 2024-05-09 DIAGNOSIS — N40.1 BENIGN PROSTATIC HYPERPLASIA WITH URINARY FREQUENCY: Primary | ICD-10-CM

## 2024-05-09 DIAGNOSIS — R35.0 BENIGN PROSTATIC HYPERPLASIA WITH URINARY FREQUENCY: Primary | ICD-10-CM

## 2024-05-09 DIAGNOSIS — N39.41 URGE INCONTINENCE: ICD-10-CM

## 2024-05-09 DIAGNOSIS — N13.8 ENLARGED PROSTATE WITH URINARY OBSTRUCTION: Primary | ICD-10-CM

## 2024-05-09 DIAGNOSIS — N32.81 OAB (OVERACTIVE BLADDER): ICD-10-CM

## 2024-05-09 PROCEDURE — 51798 US URINE CAPACITY MEASURE: CPT | Performed by: UROLOGY

## 2024-05-09 PROCEDURE — 99214 OFFICE O/P EST MOD 30 MIN: CPT | Performed by: UROLOGY

## 2024-05-09 RX ORDER — CEFAZOLIN SODIUM 2 G/100ML
2 INJECTION, SOLUTION INTRAVENOUS ONCE
Status: CANCELLED | OUTPATIENT
Start: 2024-05-09 | End: 2024-05-09

## 2024-05-09 NOTE — PROGRESS NOTES
HPI    73 y.o. male being seen with the following problem list:    Problem list:  BPH, both obstructive and irritative symptoms    5/2022 - seen for second opinion for bothersome urinary symptoms. Seen with elevated PSA of around 6.6. Got MRI with his urologist on [place]. Demonstrated a 99 g prostate with no concerning lesions. Corresponds with a PSA density of around 6 to 7%. He has significant obstructive urinary symptoms. Slow stream, straining, hesitancy. Not much frequency or urgency. On twice daily Flomax without much benefit. Saw his urologist in [place] who recommended either a TURP or a laser TURP, unclear. Comes to see me for another opinion.     No infections. No blood in urine. No retention. As above, minimal irritative symptoms. Just obstructive symptoms.     9/27/23 - seen today in follow up. last seen 5/16/22 and agreed to proceed with holep procedure. Urinary symptoms have gotten slightly worse, but are not bothersome. He is still interested in a HoLEP procedure, but is no9t ready to lose natural ejaculation at this time. He reports that his PCP was worried about his elevated PSA due to 2 of his brothers having a mhx of prostate cancer. He would like to continue to monitor for now and discuss an outlet procedure at a later time.     8/30/23 PSA 5.22     05/09/24 - PVR today 49cc. Worsening obstructive symptoms. Worsening urgency, frequency, rare UUI. Would like to move forward with a surgery at this time.    Lab Results   Component Value Date    PSA 5.22 (H) 08/30/2023    PSA 6.09 (H) 04/25/2023    PSA 6.60 (H) 11/15/2021    PSA 5.10 (H) 11/01/2021    PSA 3.39 06/27/2020         Current Medications:  Current Outpatient Medications   Medication Sig Dispense Refill    acetaminophen (Tylenol) 500 mg tablet Take 1 tablet (500 mg) by mouth.      atorvastatin (Lipitor) 40 mg tablet Take 1 tablet (40 mg) by mouth once daily at bedtime. 90 tablet 3    azelastine 205.5 mcg (0.15 %) spray,non-aerosol  Administer into affected nostril(s).      cyclobenzaprine (Flexeril) 10 mg tablet TAKE 1 TABLET BY MOUTH THREE TIMES DAILY AS NEEDED FOR MUSCLE STIFFNESS OR SPASM. DO NOT DRIVE OR OPERATE MACHINERY WHILE TAKING THIS MEDICATION 40 tablet 1    fexofenadine (Allegra) 180 mg tablet Take by mouth.      fluticasone (Flonase Sensimist) 27.5 mcg/actuation nasal spray Administer into affected nostril(s).      glucosamine sanchez 2KCl-chondroit 500-400 mg tablet Take 1 tablet by mouth 3 times a day.      Lactobacillus acidophilus 500 million cell tablet Take by mouth.      sildenafil (Revatio) 20 mg tablet Use 1-2 daily prn 75 tablet 11    tamsulosin (Flomax) 0.4 mg 24 hr capsule Take 2 capsules (0.8 mg) by mouth once daily at bedtime. 180 capsule 3    vitamins A,C,E-zinc-copper (ICaps AREDS) 7,160-113-100 unit-mg-unit tablet,delayed release (DR/EC) Take by mouth.      zoster vaccine-recombinant adjuvanted (Shingrix) 50 mcg/0.5 mL vaccine Give 1 injection, 0.5 ml IM now, and repeat in 8 weeks 0.5 mL 1     No current facility-administered medications for this visit.        Active Problems:  Robe Fernandez is a 73 y.o. male with the following Problems and Medications.  Patient Active Problem List   Diagnosis    Allergic rhinitis due to pollen    Benign prostatic hyperplasia, presence of lower urinary tract symptoms unspecified    Blood pressure elevated without history of HTN    Diverticulosis of colon    Dyslipidemia, goal LDL below 100    Elevated PSA    Hesitancy of micturition    Osteoarthritis of first carpometacarpal joint, unspecified    Tremor, essential    Weak urinary stream    Wears glasses    Inflammatory spondylopathy, unspecified spinal region (CMS-HCC)    Arthritis of both knees    Impotence of organic origin    Agatston coronary artery calcium score greater than 400     Current Outpatient Medications   Medication Sig Dispense Refill    acetaminophen (Tylenol) 500 mg tablet Take 1 tablet (500 mg) by mouth.       atorvastatin (Lipitor) 40 mg tablet Take 1 tablet (40 mg) by mouth once daily at bedtime. 90 tablet 3    azelastine 205.5 mcg (0.15 %) spray,non-aerosol Administer into affected nostril(s).      cyclobenzaprine (Flexeril) 10 mg tablet TAKE 1 TABLET BY MOUTH THREE TIMES DAILY AS NEEDED FOR MUSCLE STIFFNESS OR SPASM. DO NOT DRIVE OR OPERATE MACHINERY WHILE TAKING THIS MEDICATION 40 tablet 1    fexofenadine (Allegra) 180 mg tablet Take by mouth.      fluticasone (Flonase Sensimist) 27.5 mcg/actuation nasal spray Administer into affected nostril(s).      glucosamine sanchez 2KCl-chondroit 500-400 mg tablet Take 1 tablet by mouth 3 times a day.      Lactobacillus acidophilus 500 million cell tablet Take by mouth.      sildenafil (Revatio) 20 mg tablet Use 1-2 daily prn 75 tablet 11    tamsulosin (Flomax) 0.4 mg 24 hr capsule Take 2 capsules (0.8 mg) by mouth once daily at bedtime. 180 capsule 3    vitamins A,C,E-zinc-copper (ICaps AREDS) 7,160-113-100 unit-mg-unit tablet,delayed release (DR/EC) Take by mouth.      zoster vaccine-recombinant adjuvanted (Shingrix) 50 mcg/0.5 mL vaccine Give 1 injection, 0.5 ml IM now, and repeat in 8 weeks 0.5 mL 1     No current facility-administered medications for this visit.       PMH:  Past Medical History:   Diagnosis Date    Abnormal finding of blood chemistry, unspecified 05/11/2016    Abnormal blood chemistry    Acute frontal sinusitis, unspecified 10/06/2017    Acute frontal sinusitis    Acute maxillary sinusitis, unspecified 03/27/2018    Acute maxillary sinusitis    Acute recurrent sinusitis, unspecified 11/15/2018    Recurrent acute sinusitis    Body mass index (BMI)30.0-30.9, adult 03/19/2019    BMI 30.0-30.9,adult    Chronic sinusitis, unspecified 10/12/2017    Recurrent sinusitis    Disorder of the skin and subcutaneous tissue, unspecified 08/20/2018    Skin lesion    Encounter for general adult medical examination without abnormal findings 08/20/2018    Welcome to Medicare  preventive visit    Encounter for general adult medical examination without abnormal findings 08/30/2019    Encounter for Medicare annual wellness exam    Encounter for general adult medical examination without abnormal findings 06/16/2017    Encounter for preventive health examination    Encounter for screening for malignant neoplasm of rectum 05/12/2016    Screening for rectal cancer    Other fecal abnormalities 06/16/2017    Loose bowel movements    Overweight 03/19/2019    Overweight with body mass index (BMI) 25.0-29.9    Personal history of other diseases of the musculoskeletal system and connective tissue 04/05/2018    History of lateral epicondylitis of right elbow    Personal history of other specified conditions 04/12/2015    History of abdominal pain    Residual hemorrhoidal skin tags 08/10/2016    External hemorrhoids    Urinary tract infection, site not specified 04/12/2015    Acute UTI       PSH:  Past Surgical History:   Procedure Laterality Date    KNEE SURGERY  08/10/2016    Knee Surgery    OTHER SURGICAL HISTORY  08/10/2016    Wrist Surgery    TONSILLECTOMY  11/15/2013    Tonsillectomy       FMH:  Family History   Problem Relation Name Age of Onset    Transient ischemic attack Father      Other (cva) Father      Aortic aneurysm Father         SHx:  Social History     Tobacco Use    Smoking status: Never    Smokeless tobacco: Never   Vaping Use    Vaping status: Never Used   Substance Use Topics    Alcohol use: Yes     Comment: Rarely    Drug use: Never       Allergies:  No Known Allergies    Assessment/Plan  We discussed surgical options for his prostate and bothersome LUTS. We discussed various options including TURP, greenlight, Rezum, Urolift, HoLEP. We discussed HoLEP as a size-independent procedure that maximally de-obstructs the prostate with relatively less postop healing and recovery as compared to other modalities. We discussed the surgery in detail. Discussed the risks of bleeding,  infection, scarring, transient incontinence, rare (<1%) risk of long-term incontinence. Discussed the perioperative pathway. Discussed the near certainty of permanent ejaculatory dysfunction, but likely no change to his baseline erectile function.     We discussed management of his irritative voiding symptoms. Discussed that typically the bladder becomes hyperactive in the setting of long-term obstruction, and that resolution of the obstruction typically allows the bladder to calm down over time. This may not be immediate and can take several months. To mitigate this recovery process, we discussed the use of intradetrusor botox to facilitate better bladder storage. Discussed the mechanism of action, risks of infection, transient incomplete emptying (though likely rare in setting of concurrent outlet procedure), inefficacy, need for additional treatment down the line. Will administer 100U at time of surgery     Will schedule for 100g HoLEp + botox.        Scribe Attestation  By signing my name below, I, Little Chambers, Scribe, attest that this documentation  has been prepared under the direction and in the presence of Ry Perez MD.

## 2024-05-20 ENCOUNTER — CLINICAL SUPPORT (OUTPATIENT)
Dept: PREADMISSION TESTING | Facility: HOSPITAL | Age: 74
End: 2024-05-20
Payer: MEDICARE

## 2024-05-20 ENCOUNTER — TELEPHONE (OUTPATIENT)
Dept: PREADMISSION TESTING | Facility: HOSPITAL | Age: 74
End: 2024-05-20

## 2024-05-20 ENCOUNTER — LAB (OUTPATIENT)
Dept: LAB | Facility: LAB | Age: 74
End: 2024-05-20
Payer: MEDICARE

## 2024-05-20 DIAGNOSIS — R03.0 BLOOD PRESSURE ELEVATED WITHOUT HISTORY OF HTN: ICD-10-CM

## 2024-05-20 DIAGNOSIS — E78.5 DYSLIPIDEMIA, GOAL LDL BELOW 100: ICD-10-CM

## 2024-05-20 DIAGNOSIS — N13.8 ENLARGED PROSTATE WITH URINARY OBSTRUCTION: ICD-10-CM

## 2024-05-20 DIAGNOSIS — N39.41 URGE INCONTINENCE: ICD-10-CM

## 2024-05-20 DIAGNOSIS — N40.1 ENLARGED PROSTATE WITH URINARY OBSTRUCTION: ICD-10-CM

## 2024-05-20 LAB
ALT SERPL W P-5'-P-CCNC: 23 U/L (ref 10–52)
ANION GAP SERPL CALC-SCNC: 15 MMOL/L (ref 10–20)
BUN SERPL-MCNC: 11 MG/DL (ref 6–23)
CALCIUM SERPL-MCNC: 9.4 MG/DL (ref 8.6–10.6)
CHLORIDE SERPL-SCNC: 102 MMOL/L (ref 98–107)
CHOLEST SERPL-MCNC: 127 MG/DL (ref 0–199)
CHOLESTEROL/HDL RATIO: 2.7
CO2 SERPL-SCNC: 28 MMOL/L (ref 21–32)
CREAT SERPL-MCNC: 0.76 MG/DL (ref 0.5–1.3)
EGFRCR SERPLBLD CKD-EPI 2021: >90 ML/MIN/1.73M*2
GLUCOSE SERPL-MCNC: 96 MG/DL (ref 74–99)
HDLC SERPL-MCNC: 46.5 MG/DL
LDLC SERPL CALC-MCNC: 50 MG/DL
NON HDL CHOLESTEROL: 81 MG/DL (ref 0–149)
POTASSIUM SERPL-SCNC: 4.5 MMOL/L (ref 3.5–5.3)
SODIUM SERPL-SCNC: 140 MMOL/L (ref 136–145)
TRIGL SERPL-MCNC: 153 MG/DL (ref 0–149)
TSH SERPL-ACNC: 1.33 MIU/L (ref 0.44–3.98)
VLDL: 31 MG/DL (ref 0–40)

## 2024-05-20 PROCEDURE — 80048 BASIC METABOLIC PNL TOTAL CA: CPT

## 2024-05-20 PROCEDURE — 84460 ALANINE AMINO (ALT) (SGPT): CPT

## 2024-05-20 PROCEDURE — 36415 COLL VENOUS BLD VENIPUNCTURE: CPT

## 2024-05-20 PROCEDURE — 84443 ASSAY THYROID STIM HORMONE: CPT

## 2024-05-20 PROCEDURE — 80061 LIPID PANEL: CPT

## 2024-05-20 RX ORDER — AZELASTINE HCL 205.5 UG/1
2 SPRAY NASAL DAILY
COMMUNITY

## 2024-05-20 RX ORDER — ASPIRIN 81 MG/1
81 TABLET ORAL DAILY
COMMUNITY

## 2024-05-20 RX ORDER — BISMUTH SUBSALICYLATE 262 MG
1 TABLET,CHEWABLE ORAL DAILY
COMMUNITY

## 2024-05-21 NOTE — RESULT ENCOUNTER NOTE
Results reviewed. No urgent findings.  Will Review results in detail at upcoming office appointment scheduled soon.      Berto Ahmadi MD

## 2024-05-23 ENCOUNTER — OFFICE VISIT (OUTPATIENT)
Dept: PRIMARY CARE | Facility: CLINIC | Age: 74
End: 2024-05-23
Payer: MEDICARE

## 2024-05-23 VITALS
DIASTOLIC BLOOD PRESSURE: 84 MMHG | SYSTOLIC BLOOD PRESSURE: 120 MMHG | TEMPERATURE: 98.5 F | WEIGHT: 190 LBS | HEIGHT: 68 IN | BODY MASS INDEX: 28.79 KG/M2 | HEART RATE: 71 BPM | OXYGEN SATURATION: 98 %

## 2024-05-23 DIAGNOSIS — H91.93 BILATERAL CHANGE IN HEARING: ICD-10-CM

## 2024-05-23 DIAGNOSIS — M46.90 INFLAMMATORY SPONDYLOPATHY, UNSPECIFIED SPINAL REGION (CMS-HCC): ICD-10-CM

## 2024-05-23 DIAGNOSIS — Z71.85 IMMUNIZATION COUNSELING: Primary | ICD-10-CM

## 2024-05-23 DIAGNOSIS — Z00.00 ROUTINE GENERAL MEDICAL EXAMINATION AT HEALTH CARE FACILITY: ICD-10-CM

## 2024-05-23 DIAGNOSIS — Z23 IMMUNIZATION DUE: ICD-10-CM

## 2024-05-23 PROCEDURE — 99214 OFFICE O/P EST MOD 30 MIN: CPT | Performed by: INTERNAL MEDICINE

## 2024-05-23 PROCEDURE — 1159F MED LIST DOCD IN RCRD: CPT | Performed by: INTERNAL MEDICINE

## 2024-05-23 PROCEDURE — G2211 COMPLEX E/M VISIT ADD ON: HCPCS | Performed by: INTERNAL MEDICINE

## 2024-05-23 PROCEDURE — 1160F RVW MEDS BY RX/DR IN RCRD: CPT | Performed by: INTERNAL MEDICINE

## 2024-05-23 PROCEDURE — 1123F ACP DISCUSS/DSCN MKR DOCD: CPT | Performed by: INTERNAL MEDICINE
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
## 2024-05-30 ENCOUNTER — PRE-ADMISSION TESTING (OUTPATIENT)
Dept: PREADMISSION TESTING | Facility: HOSPITAL | Age: 74
End: 2024-05-30
Payer: MEDICARE

## 2024-05-30 ENCOUNTER — LAB (OUTPATIENT)
Dept: LAB | Facility: LAB | Age: 74
End: 2024-05-30
Payer: MEDICARE

## 2024-05-30 ENCOUNTER — HOSPITAL ENCOUNTER (OUTPATIENT)
Dept: CARDIOLOGY | Facility: HOSPITAL | Age: 74
Discharge: HOME | End: 2024-05-30
Payer: MEDICARE

## 2024-05-30 VITALS
SYSTOLIC BLOOD PRESSURE: 121 MMHG | HEIGHT: 68 IN | BODY MASS INDEX: 28.73 KG/M2 | HEART RATE: 78 BPM | DIASTOLIC BLOOD PRESSURE: 71 MMHG | RESPIRATION RATE: 18 BRPM | TEMPERATURE: 98.2 F | OXYGEN SATURATION: 94 % | WEIGHT: 189.6 LBS

## 2024-05-30 DIAGNOSIS — N40.0 BENIGN PROSTATIC HYPERPLASIA, PRESENCE OF LOWER URINARY TRACT SYMPTOMS UNSPECIFIED: ICD-10-CM

## 2024-05-30 DIAGNOSIS — R03.0 BLOOD PRESSURE ELEVATED WITHOUT HISTORY OF HTN: ICD-10-CM

## 2024-05-30 DIAGNOSIS — N39.41 URGE INCONTINENCE: ICD-10-CM

## 2024-05-30 DIAGNOSIS — N40.1 ENLARGED PROSTATE WITH URINARY OBSTRUCTION: ICD-10-CM

## 2024-05-30 DIAGNOSIS — N13.8 ENLARGED PROSTATE WITH URINARY OBSTRUCTION: ICD-10-CM

## 2024-05-30 DIAGNOSIS — R03.0 BLOOD PRESSURE ELEVATED WITHOUT HISTORY OF HTN: Primary | ICD-10-CM

## 2024-05-30 LAB
ANION GAP SERPL CALC-SCNC: 11 MMOL/L (ref 10–20)
APPEARANCE UR: CLEAR
BASOPHILS # BLD AUTO: 0.03 X10*3/UL (ref 0–0.1)
BASOPHILS NFR BLD AUTO: 0.6 %
BILIRUB UR STRIP.AUTO-MCNC: NEGATIVE MG/DL
BUN SERPL-MCNC: 16 MG/DL (ref 6–23)
CALCIUM SERPL-MCNC: 9.8 MG/DL (ref 8.6–10.3)
CHLORIDE SERPL-SCNC: 100 MMOL/L (ref 98–107)
CO2 SERPL-SCNC: 31 MMOL/L (ref 21–32)
COLOR UR: NORMAL
CREAT SERPL-MCNC: 0.78 MG/DL (ref 0.5–1.3)
EGFRCR SERPLBLD CKD-EPI 2021: >90 ML/MIN/1.73M*2
EOSINOPHIL # BLD AUTO: 0.09 X10*3/UL (ref 0–0.4)
EOSINOPHIL NFR BLD AUTO: 1.9 %
ERYTHROCYTE [DISTWIDTH] IN BLOOD BY AUTOMATED COUNT: 13.6 % (ref 11.5–14.5)
GLUCOSE SERPL-MCNC: 93 MG/DL (ref 74–99)
GLUCOSE UR STRIP.AUTO-MCNC: NORMAL MG/DL
HCT VFR BLD AUTO: 48.6 % (ref 41–52)
HGB BLD-MCNC: 15.8 G/DL (ref 13.5–17.5)
HOLD SPECIMEN: NORMAL
IMM GRANULOCYTES # BLD AUTO: 0.03 X10*3/UL (ref 0–0.5)
IMM GRANULOCYTES NFR BLD AUTO: 0.6 % (ref 0–0.9)
KETONES UR STRIP.AUTO-MCNC: NEGATIVE MG/DL
LEUKOCYTE ESTERASE UR QL STRIP.AUTO: NEGATIVE
LYMPHOCYTES # BLD AUTO: 1.33 X10*3/UL (ref 0.8–3)
LYMPHOCYTES NFR BLD AUTO: 28.4 %
MCH RBC QN AUTO: 27.3 PG (ref 26–34)
MCHC RBC AUTO-ENTMCNC: 32.5 G/DL (ref 32–36)
MCV RBC AUTO: 84 FL (ref 80–100)
MONOCYTES # BLD AUTO: 0.4 X10*3/UL (ref 0.05–0.8)
MONOCYTES NFR BLD AUTO: 8.5 %
NEUTROPHILS # BLD AUTO: 2.8 X10*3/UL (ref 1.6–5.5)
NEUTROPHILS NFR BLD AUTO: 60 %
NITRITE UR QL STRIP.AUTO: NEGATIVE
NRBC BLD-RTO: 0 /100 WBCS (ref 0–0)
PH UR STRIP.AUTO: 6.5 [PH]
PLATELET # BLD AUTO: 256 X10*3/UL (ref 150–450)
POTASSIUM SERPL-SCNC: 4.6 MMOL/L (ref 3.5–5.3)
PROT UR STRIP.AUTO-MCNC: NEGATIVE MG/DL
RBC # BLD AUTO: 5.78 X10*6/UL (ref 4.5–5.9)
RBC # UR STRIP.AUTO: NEGATIVE /UL
SODIUM SERPL-SCNC: 137 MMOL/L (ref 136–145)
SP GR UR STRIP.AUTO: 1.01
UROBILINOGEN UR STRIP.AUTO-MCNC: NORMAL MG/DL
WBC # BLD AUTO: 4.7 X10*3/UL (ref 4.4–11.3)

## 2024-05-30 PROCEDURE — 80048 BASIC METABOLIC PNL TOTAL CA: CPT

## 2024-05-30 PROCEDURE — 93005 ELECTROCARDIOGRAM TRACING: CPT

## 2024-05-30 PROCEDURE — 81003 URINALYSIS AUTO W/O SCOPE: CPT

## 2024-05-30 PROCEDURE — 85025 COMPLETE CBC W/AUTO DIFF WBC: CPT

## 2024-05-30 PROCEDURE — 36415 COLL VENOUS BLD VENIPUNCTURE: CPT

## 2024-05-30 ASSESSMENT — ENCOUNTER SYMPTOMS
GASTROINTESTINAL NEGATIVE: 1
COUGH: 1
NECK NEGATIVE: 1
CARDIOVASCULAR NEGATIVE: 1
CONSTITUTIONAL NEGATIVE: 1
RHINORRHEA: 1
ARTHRALGIAS: 1

## 2024-05-30 NOTE — H&P (VIEW-ONLY)
CPM/PAT Evaluation       Name: Robe Fernandez (Robe Fernandez)  /Age: 1950/73 y.o.     SURGEON :DR MARY VILLANUEVA  Surgery, Date, and Length:  Holmium Laser Enucleation Transurethral Prostate Cytsocopy; Botox Inejction ~ 100 Units 6/10/24      HPI:  This a 73 y.o. male who presents for presurgical evaluation for  for above mentioned procedure. Pt states LUTS . He had an MRI showing enlarged prostate .After discussion of the risks and benefits with Dr. VILLANUEVA the patient elects to proceed with the planned procedure.       Past Medical History:   Diagnosis Date    Allergic rhinitis     BPH (benign prostatic hyperplasia)     Coronary artery disease     2023: CT calcium score 1673 saw Dr. Perez, no stress test needed d/t very active.  Had AAA screening d/t family history which was negative, no further testing needed.    Diverticulosis of colon     Elevated BP without diagnosis of hypertension     home BPs WNL    Elevated PSA     Hyperlipidemia     OAB (overactive bladder)     Osteoarthritis     Tremor, essential     resolved per pt       Past Surgical History:   Procedure Laterality Date    COLONOSCOPY      KNEE SURGERY Bilateral     partial replacement    TONSILLECTOMY      WRIST SURGERY Left     lunate bone surgery     Anesthesia History  Pt denies any past history of anesthetic complications such as PONV, awareness, prolonged sedation, dental damage, aspiration, cardiac arrest, difficult intubation, difficult I.V. access or unexpected hospital admissions.  NO malignant hyperthermia and or pseudo cholinesterase deficiency.    The patient is not  a Baptism and will accept blood and blood products if medically indicated.   No history of blood transfusions .Type and screen not sent.      Social History  Social History     Substance and Sexual Activity   Drug Use Never      Social History     Substance and Sexual Activity   Alcohol Use Yes    Comment: once/month      Social History     Tobacco Use   Smoking Status  Never   Smokeless Tobacco Never           Family History   Problem Relation Name Age of Onset    Transient ischemic attack Father      Stroke Father      Aortic aneurysm Father      Prostate cancer Brother      Prostate cancer Brother         No Known Allergies    Prior to Admission medications    Medication Sig Start Date End Date Taking? Authorizing Provider   acetaminophen (Tylenol) 500 mg tablet Take 1 tablet (500 mg) by mouth.    Historical Provider, MD   aspirin 81 mg EC tablet Take 1 tablet (81 mg) by mouth once daily.    Historical Provider, MD   atorvastatin (Lipitor) 40 mg tablet Take 1 tablet (40 mg) by mouth once daily at bedtime. 10/12/23 10/11/24  Jun Perez MD   azelastine 205.5 mcg (0.15 %) spray,non-aerosol Administer 2 sprays into affected nostril(s) once daily.    Historical Provider, jorge Madden acell,,tetanus (BoostRIX) 2.5-8-5 Lf-mcg-Lf/0.5mL injection Inject 0.5 mL into the muscle 1 time for 1 dose. 5/23/24 5/23/24  Berto Ahmadi MD   fexofenadine (Allegra) 180 mg tablet Take by mouth. 1/16/17   Historical Provider, MD   fluticasone (Flonase Sensimist) 27.5 mcg/actuation nasal spray Administer into affected nostril(s). 3/25/19   Historical Provider, MD   glucosamine sanchez 2KCl-chondroit 500-400 mg tablet Take 1 tablet by mouth once daily. 6/24/11   Historical Provider, MD   Lactobacillus acidophilus 500 million cell tablet Take 1 tablet by mouth if needed. 5/19/16   Historical Provider, MD   multivitamin tablet Take 1 tablet by mouth once daily.    Historical Provider, MD   respiratory syncytial virus, RSV, vaccine, adjuvanted, age 60y+ (Arexvy) 120 mcg/0.5 mL suspension for reconstitution Inject 0.5 mL into the muscle 1 time for 1 dose. 5/23/24 5/23/24  Berto Ahmadi MD   sildenafil (Revatio) 20 mg tablet Use 1-2 daily prn 11/9/23   Berto Ahmadi MD   tamsulosin (Flomax) 0.4 mg 24 hr capsule Take 2 capsules (0.8 mg) by mouth once daily at bedtime. 8/17/23   Berto Ahmadi MD   vitamins  "A,C,E-zinc-copper (ICaps AREDS) 7,160-113-100 unit-mg-unit tablet,delayed release (DR/EC) Take by mouth. 9/14/20   Historical Provider, MD [facility] ROS:   Constitutional:   neg    Neuro/Psych:   Eyes:   Ears:   Nose:    nasal discharge  Mouth:   neg    Throat:   neg    Neck:   neg    Cardio:   neg    Respiratory:    cough (2/2 PND)  Endocrine:   GI:   neg    :   neg    Musculoskeletal:    arthralgias (generalized)  Hematologic:   neg    Skin:  neg        Physical Exam  Vitals reviewed.   Constitutional:       Appearance: Normal appearance.   HENT:      Mouth/Throat:      Mouth: Mucous membranes are moist.   Eyes:      Extraocular Movements: Extraocular movements intact.      Pupils: Pupils are equal, round, and reactive to light.   Cardiovascular:      Rate and Rhythm: Normal rate and regular rhythm.      Pulses: Normal pulses.      Heart sounds: Normal heart sounds.   Pulmonary:      Effort: Pulmonary effort is normal.      Breath sounds: Normal breath sounds.   Musculoskeletal:         General: Normal range of motion.      Cervical back: Normal range of motion.   Skin:     General: Skin is warm and dry.   Neurological:      Mental Status: He is alert and oriented to person, place, and time.   Psychiatric:         Mood and Affect: Mood normal.         Behavior: Behavior normal.          PAT AIRWAY:   Airway:     Mallampati::  III   Perm upper bridge       /71   Pulse 78   Temp 36.8 °C (98.2 °F)   Resp 18   Ht 1.725 m (5' 7.91\")   Wt 86 kg (189 lb 9.5 oz)   SpO2 94%   BMI 28.90 kg/m²     EKG IN EPIC     Lab Results   Component Value Date    WBC 4.7 05/30/2024    HGB 15.8 05/30/2024    HCT 48.6 05/30/2024    MCV 84 05/30/2024     05/30/2024     Lab Results   Component Value Date    GLUCOSE 93 05/30/2024    CALCIUM 9.8 05/30/2024     05/30/2024    K 4.6 05/30/2024    CO2 31 05/30/2024     05/30/2024    BUN 16 05/30/2024    CREATININE 0.78 05/30/2024     UFLEX NORMAL "     ASSESSMENT/PLAN    Patient is a  73 year-old  scheduled for Holmium Laser Enucleation Transurethral Prostate Cytsocopy; Botox Inejction with Dr. Perez   on  6/10/24 .      CARDIOVASCULAR:  RCRI score / Risk: The patients score is 1 based on history . Per ACC/AHA guidelines this places him  at  3.9% risk for MACE undergoing a intermediate  risk procedure . The patient has the following risk factors:non obstructive cad  Functional Capacity: The patients exercise tolerance is  4  METS. This is based on the patient's  abililty to ascend a flight of stairs  and walk 2 block w/o chest pain or other anginal equivalents.. Patient denies  active cardiac symptoms or anginal equivalents .      PULMONARY:  The patient has the following factors that place them at increased risk of perioperative pulmonary complications;age greater than 65/BMI greater than 27.  Postoperatively the patient would benefit from early pulmonary toilet/incentive spirometry q 1-2 hours while awake/pulse oximetry/cautious use of respiratory depressant medications such as opioids/elevate the HOB/oral hygiene.      DVT:  CAPRINI SCORE=5  The patient has the following factors that increase his  Risk for thrombus formation ; Virchow's triad , age>70, bmi>25, Surgical procedure >1 hrs  procedure .    Recommendations: DVT prophylaxis  per Dr. Perez protocol . SCD's, MARICARMEN's, and early ambulation are recommended. Heparin or LMWH is recommended for the very high risk .      Risk assessment complete.  Patient is scheduled for  intermediate  surgical risk procedure.  Patient is considered an acceptable  risk to proceed with the planned procedure.      Preoperative medication instructions were provided and reviewed with the patient.  Any additional testing or evaluation was explained to the patient.  Nothing by mouth instructions were discussed and patient's questions were answered prior to conclusion to this encounter.  Patient verbalized understanding of  preoperative instructions given in preadmission testing; discharge instructions available in EMR.

## 2024-05-30 NOTE — PREPROCEDURE INSTRUCTIONS
Medication List            Accurate as of May 30, 2024 10:13 AM. Always use your most recent med list.                acetaminophen 500 mg tablet  Commonly known as: Tylenol  Notes to patient: Use if needed      aspirin 81 mg EC tablet  Medication Adjustments for Surgery: Stop 7 days before surgery     atorvastatin 40 mg tablet  Commonly known as: Lipitor  Take 1 tablet (40 mg) by mouth once daily at bedtime.  Medication Adjustments for Surgery: Take morning of surgery with sip of water, no other fluids     azelastine 205.5 mcg (0.15 %) spray,non-aerosol  Medication Adjustments for Surgery: Continue until night before surgery     fexofenadine 180 mg tablet  Commonly known as: Allegra  Medication Adjustments for Surgery: Continue until night before surgery     Flonase Sensimist 27.5 mcg/actuation nasal spray  Generic drug: fluticasone  Medication Adjustments for Surgery: Continue until night before surgery     glucosamine sanchez 2KCl-chondroit 500-400 mg tablet  Medication Adjustments for Surgery: Stop 7 days before surgery     ICaps AREDS 7,160-113-100 unit-mg-unit tablet,delayed release (DR/EC)  Generic drug: vitamins A,C,E-zinc-copper  Medication Adjustments for Surgery: Stop 7 days before surgery     Lactobacillus acidophilus 500 million cell tablet  Medication Adjustments for Surgery: Continue until night before surgery     multivitamin tablet  Medication Adjustments for Surgery: Stop 7 days before surgery     sildenafil 20 mg tablet  Commonly known as: Revatio  Use 1-2 daily prn  Medication Adjustments for Surgery: Stop 2 days before surgery     tamsulosin 0.4 mg 24 hr capsule  Commonly known as: Flomax  Take 2 capsules (0.8 mg) by mouth once daily at bedtime.  Medication Adjustments for Surgery: Take morning of surgery with sip of water, no other fluids                        CONTACT SURGEON'S OFFICE IF YOU DEVELOP:  * Fever = 100.4 F   * New respiratory symptoms (e.g. cough, shortness of breath, respiratory  distress, sore throat)  * Recent loss of taste or smell  *Flu like symptoms such as headache, fatigue or gastrointestinal symptoms  * You develop any open sores, shingles, burning or painful urination   AND/OR:  * You no longer wish to have the surgery.  * Any other personal circumstances change that may lead to the need to cancel or defer this surgery.  *You were admitted to any hospital within one week of your planned procedure.    SMOKING:  *Quitting smoking can make a huge difference to your health and recovery from surgery.    *If you need help with quitting, call [phone]-NOW.    THE DAY BEFORE SURGERY:  *Do not eat any food after midnight the night before surgery.   to 13.5 ounces of clear liquid until TWO hours before your instructed arrival time to the hospital  DIABETICS:  Please check fasting blood sugar  upon waking up.  If fasting sugar is <80 mg/dl, please drink 100ml/3oz of apple juice no later than 2 hours prior to surgery.      SURGICAL TIME  *You will be contacted between 2 p.m. and 6 p.m. the business day before your surgery with your arrival time.  *If you haven't received a call by 6pm, call [phone].  *Scheduled surgery times may change and you will be notified if this occurs-check your personal voicemail for any updates.    ON THE MORNING OF SURGERY:  *Wear comfortable, loose fitting clothing.   *Do not use moisturizers, creams, lotions or perfume.  *All jewelry and valuables should be left at home.  *Prosthetic devices such as contact lenses, hearing aids, dentures, eyelash extensions, hairpins and body piercing must be removed before surgery.    BRING WITH YOU:  *Photo ID and insurance card  *Current list of medicines and allergies  *Pacemaker/Defibrillator/Heart stent cards  *CPAP machine and mask  *Slings/splints/crutches  *Copy of your complete Advanced Directive/DHPOA-if applicable  *Neurostimulator implant remote    PARKING AND ARRIVAL:  *Check in at the Main Entrance desk and let  them know you are here for surgery.  *You will be directed to the 2nd floor surgical waiting area.    AFTER OUTPATIENT SURGERY:  *A responsible adult MUST accompany you at the time of discharge and stay with you for 24 hours after your surgery.  *You may NOT drive yourself home after surgery.  *You may use a taxi or ride sharing service ([employer], [employer]) to return home ONLY if you are accompanied by a friend or family member.  *Instructions for resuming your medications will be provided by your surgeon.    YOU HAVE REVIEWED THE MEDICATIONS ON THIS SHEET AND YOU VERIFY THESE ARE ALL THE MEDICATIONS AND OVER THE COUNTER MEDICATIONS THAT YOU TAKE .

## 2024-05-30 NOTE — CPM/PAT H&P
CPM/PAT Evaluation       Name: Robe Fernandez (Robe Fernandez)  /Age: 1950/73 y.o.     SURGEON :DR MARY VILLANUEVA  Surgery, Date, and Length:  Holmium Laser Enucleation Transurethral Prostate Cytsocopy; Botox Inejction ~ 100 Units 6/10/24      HPI:  This a 73 y.o. male who presents for presurgical evaluation for  for above mentioned procedure. Pt states LUTS . He had an MRI showing enlarged prostate .After discussion of the risks and benefits with Dr. VILLANUEVA the patient elects to proceed with the planned procedure.       Past Medical History:   Diagnosis Date    Allergic rhinitis     BPH (benign prostatic hyperplasia)     Coronary artery disease     2023: CT calcium score 1673 saw Dr. Perez, no stress test needed d/t very active.  Had AAA screening d/t family history which was negative, no further testing needed.    Diverticulosis of colon     Elevated BP without diagnosis of hypertension     home BPs WNL    Elevated PSA     Hyperlipidemia     OAB (overactive bladder)     Osteoarthritis     Tremor, essential     resolved per pt       Past Surgical History:   Procedure Laterality Date    COLONOSCOPY      KNEE SURGERY Bilateral     partial replacement    TONSILLECTOMY      WRIST SURGERY Left     lunate bone surgery     Anesthesia History  Pt denies any past history of anesthetic complications such as PONV, awareness, prolonged sedation, dental damage, aspiration, cardiac arrest, difficult intubation, difficult I.V. access or unexpected hospital admissions.  NO malignant hyperthermia and or pseudo cholinesterase deficiency.    The patient is not  a Yazidi and will accept blood and blood products if medically indicated.   No history of blood transfusions .Type and screen not sent.      Social History  Social History     Substance and Sexual Activity   Drug Use Never      Social History     Substance and Sexual Activity   Alcohol Use Yes    Comment: once/month      Social History     Tobacco Use   Smoking Status  Never   Smokeless Tobacco Never           Family History   Problem Relation Name Age of Onset    Transient ischemic attack Father      Stroke Father      Aortic aneurysm Father      Prostate cancer Brother      Prostate cancer Brother         No Known Allergies    Prior to Admission medications    Medication Sig Start Date End Date Taking? Authorizing Provider   acetaminophen (Tylenol) 500 mg tablet Take 1 tablet (500 mg) by mouth.    Historical Provider, MD   aspirin 81 mg EC tablet Take 1 tablet (81 mg) by mouth once daily.    Historical Provider, MD   atorvastatin (Lipitor) 40 mg tablet Take 1 tablet (40 mg) by mouth once daily at bedtime. 10/12/23 10/11/24  Jun Perez MD   azelastine 205.5 mcg (0.15 %) spray,non-aerosol Administer 2 sprays into affected nostril(s) once daily.    Historical Provider, jorge Madden acell,,tetanus (BoostRIX) 2.5-8-5 Lf-mcg-Lf/0.5mL injection Inject 0.5 mL into the muscle 1 time for 1 dose. 5/23/24 5/23/24  Berto Ahmadi MD   fexofenadine (Allegra) 180 mg tablet Take by mouth. 1/16/17   Historical Provider, MD   fluticasone (Flonase Sensimist) 27.5 mcg/actuation nasal spray Administer into affected nostril(s). 3/25/19   Historical Provider, MD   glucosamine sanchez 2KCl-chondroit 500-400 mg tablet Take 1 tablet by mouth once daily. 6/24/11   Historical Provider, MD   Lactobacillus acidophilus 500 million cell tablet Take 1 tablet by mouth if needed. 5/19/16   Historical Provider, MD   multivitamin tablet Take 1 tablet by mouth once daily.    Historical Provider, MD   respiratory syncytial virus, RSV, vaccine, adjuvanted, age 60y+ (Arexvy) 120 mcg/0.5 mL suspension for reconstitution Inject 0.5 mL into the muscle 1 time for 1 dose. 5/23/24 5/23/24  Berto Ahmadi MD   sildenafil (Revatio) 20 mg tablet Use 1-2 daily prn 11/9/23   Berto Ahmadi MD   tamsulosin (Flomax) 0.4 mg 24 hr capsule Take 2 capsules (0.8 mg) by mouth once daily at bedtime. 8/17/23   Berto Ahmadi MD   vitamins  "A,C,E-zinc-copper (ICaps AREDS) 7,160-113-100 unit-mg-unit tablet,delayed release (DR/EC) Take by mouth. 9/14/20   Historical Provider, MD [facility] ROS:   Constitutional:   neg    Neuro/Psych:   Eyes:   Ears:   Nose:    nasal discharge  Mouth:   neg    Throat:   neg    Neck:   neg    Cardio:   neg    Respiratory:    cough (2/2 PND)  Endocrine:   GI:   neg    :   neg    Musculoskeletal:    arthralgias (generalized)  Hematologic:   neg    Skin:  neg        Physical Exam  Vitals reviewed.   Constitutional:       Appearance: Normal appearance.   HENT:      Mouth/Throat:      Mouth: Mucous membranes are moist.   Eyes:      Extraocular Movements: Extraocular movements intact.      Pupils: Pupils are equal, round, and reactive to light.   Cardiovascular:      Rate and Rhythm: Normal rate and regular rhythm.      Pulses: Normal pulses.      Heart sounds: Normal heart sounds.   Pulmonary:      Effort: Pulmonary effort is normal.      Breath sounds: Normal breath sounds.   Musculoskeletal:         General: Normal range of motion.      Cervical back: Normal range of motion.   Skin:     General: Skin is warm and dry.   Neurological:      Mental Status: He is alert and oriented to person, place, and time.   Psychiatric:         Mood and Affect: Mood normal.         Behavior: Behavior normal.          PAT AIRWAY:   Airway:     Mallampati::  III   Perm upper bridge       /71   Pulse 78   Temp 36.8 °C (98.2 °F)   Resp 18   Ht 1.725 m (5' 7.91\")   Wt 86 kg (189 lb 9.5 oz)   SpO2 94%   BMI 28.90 kg/m²     EKG IN EPIC     Lab Results   Component Value Date    WBC 4.7 05/30/2024    HGB 15.8 05/30/2024    HCT 48.6 05/30/2024    MCV 84 05/30/2024     05/30/2024     Lab Results   Component Value Date    GLUCOSE 93 05/30/2024    CALCIUM 9.8 05/30/2024     05/30/2024    K 4.6 05/30/2024    CO2 31 05/30/2024     05/30/2024    BUN 16 05/30/2024    CREATININE 0.78 05/30/2024     UFLEX NORMAL "     ASSESSMENT/PLAN    Patient is a  73 year-old  scheduled for Holmium Laser Enucleation Transurethral Prostate Cytsocopy; Botox Inejction with Dr. Perez   on  6/10/24 .      CARDIOVASCULAR:  RCRI score / Risk: The patients score is 1 based on history . Per ACC/AHA guidelines this places him  at  3.9% risk for MACE undergoing a intermediate  risk procedure . The patient has the following risk factors:non obstructive cad  Functional Capacity: The patients exercise tolerance is  4  METS. This is based on the patient's  abililty to ascend a flight of stairs  and walk 2 block w/o chest pain or other anginal equivalents.. Patient denies  active cardiac symptoms or anginal equivalents .      PULMONARY:  The patient has the following factors that place them at increased risk of perioperative pulmonary complications;age greater than 65/BMI greater than 27.  Postoperatively the patient would benefit from early pulmonary toilet/incentive spirometry q 1-2 hours while awake/pulse oximetry/cautious use of respiratory depressant medications such as opioids/elevate the HOB/oral hygiene.      DVT:  CAPRINI SCORE=5  The patient has the following factors that increase his  Risk for thrombus formation ; Virchow's triad , age>70, bmi>25, Surgical procedure >1 hrs  procedure .    Recommendations: DVT prophylaxis  per Dr. Perez protocol . SCD's, MARICARMEN's, and early ambulation are recommended. Heparin or LMWH is recommended for the very high risk .      Risk assessment complete.  Patient is scheduled for  intermediate  surgical risk procedure.  Patient is considered an acceptable  risk to proceed with the planned procedure.      Preoperative medication instructions were provided and reviewed with the patient.  Any additional testing or evaluation was explained to the patient.  Nothing by mouth instructions were discussed and patient's questions were answered prior to conclusion to this encounter.  Patient verbalized understanding of  preoperative instructions given in preadmission testing; discharge instructions available in EMR.

## 2024-05-31 LAB
ATRIAL RATE: 74 BPM
P AXIS: 38 DEGREES
P OFFSET: 199 MS
P ONSET: 142 MS
PR INTERVAL: 164 MS
Q ONSET: 224 MS
QRS COUNT: 13 BEATS
QRS DURATION: 94 MS
QT INTERVAL: 354 MS
QTC CALCULATION(BAZETT): 392 MS
QTC FREDERICIA: 379 MS
R AXIS: 0 DEGREES
T AXIS: 48 DEGREES
T OFFSET: 401 MS
VENTRICULAR RATE: 74 BPM

## 2024-06-07 ENCOUNTER — ANESTHESIA EVENT (OUTPATIENT)
Dept: OPERATING ROOM | Facility: HOSPITAL | Age: 74
End: 2024-06-07
Payer: MEDICARE

## 2024-06-10 ENCOUNTER — ANESTHESIA (OUTPATIENT)
Dept: OPERATING ROOM | Facility: HOSPITAL | Age: 74
End: 2024-06-10
Payer: MEDICARE

## 2024-06-10 ENCOUNTER — HOSPITAL ENCOUNTER (OUTPATIENT)
Facility: HOSPITAL | Age: 74
Setting detail: OUTPATIENT SURGERY
Discharge: HOME | End: 2024-06-10
Attending: UROLOGY | Admitting: UROLOGY
Payer: MEDICARE

## 2024-06-10 VITALS
OXYGEN SATURATION: 96 % | SYSTOLIC BLOOD PRESSURE: 119 MMHG | TEMPERATURE: 97.5 F | HEIGHT: 68 IN | DIASTOLIC BLOOD PRESSURE: 78 MMHG | BODY MASS INDEX: 28.87 KG/M2 | RESPIRATION RATE: 19 BRPM | WEIGHT: 190.48 LBS | HEART RATE: 67 BPM

## 2024-06-10 DIAGNOSIS — N40.0 BENIGN PROSTATIC HYPERPLASIA, PRESENCE OF LOWER URINARY TRACT SYMPTOMS UNSPECIFIED: Primary | ICD-10-CM

## 2024-06-10 DIAGNOSIS — N40.1 ENLARGED PROSTATE WITH URINARY OBSTRUCTION: ICD-10-CM

## 2024-06-10 DIAGNOSIS — G89.18 ACUTE POST-OPERATIVE PAIN: ICD-10-CM

## 2024-06-10 DIAGNOSIS — N13.8 ENLARGED PROSTATE WITH URINARY OBSTRUCTION: ICD-10-CM

## 2024-06-10 DIAGNOSIS — N39.41 URGE INCONTINENCE: ICD-10-CM

## 2024-06-10 PROCEDURE — 3600000004 HC OR TIME - INITIAL BASE CHARGE - PROCEDURE LEVEL FOUR: Performed by: UROLOGY

## 2024-06-10 PROCEDURE — C1889 IMPLANT/INSERT DEVICE, NOC: HCPCS | Performed by: UROLOGY

## 2024-06-10 PROCEDURE — C1758 CATHETER, URETERAL: HCPCS | Performed by: UROLOGY

## 2024-06-10 PROCEDURE — 7100000002 HC RECOVERY ROOM TIME - EACH INCREMENTAL 1 MINUTE: Performed by: UROLOGY

## 2024-06-10 PROCEDURE — 2500000005 HC RX 250 GENERAL PHARMACY W/O HCPCS: Performed by: NURSE ANESTHETIST, CERTIFIED REGISTERED

## 2024-06-10 PROCEDURE — 3700000002 HC GENERAL ANESTHESIA TIME - EACH INCREMENTAL 1 MINUTE: Performed by: UROLOGY

## 2024-06-10 PROCEDURE — 96372 THER/PROPH/DIAG INJ SC/IM: CPT | Performed by: UROLOGY

## 2024-06-10 PROCEDURE — 2500000004 HC RX 250 GENERAL PHARMACY W/ HCPCS (ALT 636 FOR OP/ED): Performed by: NURSE ANESTHETIST, CERTIFIED REGISTERED

## 2024-06-10 PROCEDURE — 2500000001 HC RX 250 WO HCPCS SELF ADMINISTERED DRUGS (ALT 637 FOR MEDICARE OP): Performed by: ANESTHESIOLOGY

## 2024-06-10 PROCEDURE — 7100000010 HC PHASE TWO TIME - EACH INCREMENTAL 1 MINUTE: Performed by: UROLOGY

## 2024-06-10 PROCEDURE — 7100000001 HC RECOVERY ROOM TIME - INITIAL BASE CHARGE: Performed by: UROLOGY

## 2024-06-10 PROCEDURE — 3600000009 HC OR TIME - EACH INCREMENTAL 1 MINUTE - PROCEDURE LEVEL FOUR: Performed by: UROLOGY

## 2024-06-10 PROCEDURE — 3700000001 HC GENERAL ANESTHESIA TIME - INITIAL BASE CHARGE: Performed by: UROLOGY

## 2024-06-10 PROCEDURE — 52649 PROSTATE LASER ENUCLEATION: CPT | Performed by: UROLOGY

## 2024-06-10 PROCEDURE — A4217 STERILE WATER/SALINE, 500 ML: HCPCS | Performed by: UROLOGY

## 2024-06-10 PROCEDURE — 52287 CYSTOSCOPY CHEMODENERVATION: CPT | Performed by: UROLOGY

## 2024-06-10 PROCEDURE — 0754T DGTZ GLS MCRSCP SLD LEVEL V: CPT | Mod: TC,AHULAB | Performed by: UROLOGY

## 2024-06-10 PROCEDURE — 2500000004 HC RX 250 GENERAL PHARMACY W/ HCPCS (ALT 636 FOR OP/ED): Mod: JZ | Performed by: UROLOGY

## 2024-06-10 PROCEDURE — 7100000009 HC PHASE TWO TIME - INITIAL BASE CHARGE: Performed by: UROLOGY

## 2024-06-10 PROCEDURE — 2500000004 HC RX 250 GENERAL PHARMACY W/ HCPCS (ALT 636 FOR OP/ED): Performed by: ANESTHESIOLOGY

## 2024-06-10 PROCEDURE — 2720000007 HC OR 272 NO HCPCS: Performed by: UROLOGY

## 2024-06-10 RX ORDER — PHENAZOPYRIDINE HYDROCHLORIDE 200 MG/1
200 TABLET, FILM COATED ORAL 3 TIMES DAILY PRN
Qty: 10 TABLET | Refills: 0 | Status: SHIPPED | OUTPATIENT
Start: 2024-06-10

## 2024-06-10 RX ORDER — ALBUTEROL SULFATE 0.83 MG/ML
2.5 SOLUTION RESPIRATORY (INHALATION) ONCE AS NEEDED
Status: DISCONTINUED | OUTPATIENT
Start: 2024-06-10 | End: 2024-06-10 | Stop reason: HOSPADM

## 2024-06-10 RX ORDER — DIPHENHYDRAMINE HYDROCHLORIDE 50 MG/ML
12.5 INJECTION INTRAMUSCULAR; INTRAVENOUS ONCE AS NEEDED
Status: DISCONTINUED | OUTPATIENT
Start: 2024-06-10 | End: 2024-06-10 | Stop reason: HOSPADM

## 2024-06-10 RX ORDER — CEFAZOLIN 1 G/1
INJECTION, POWDER, FOR SOLUTION INTRAVENOUS AS NEEDED
Status: DISCONTINUED | OUTPATIENT
Start: 2024-06-10 | End: 2024-06-10

## 2024-06-10 RX ORDER — SODIUM CHLORIDE, SODIUM LACTATE, POTASSIUM CHLORIDE, CALCIUM CHLORIDE 600; 310; 30; 20 MG/100ML; MG/100ML; MG/100ML; MG/100ML
100 INJECTION, SOLUTION INTRAVENOUS CONTINUOUS
Status: DISCONTINUED | OUTPATIENT
Start: 2024-06-10 | End: 2024-06-10 | Stop reason: HOSPADM

## 2024-06-10 RX ORDER — ONDANSETRON HYDROCHLORIDE 2 MG/ML
INJECTION, SOLUTION INTRAVENOUS AS NEEDED
Status: DISCONTINUED | OUTPATIENT
Start: 2024-06-10 | End: 2024-06-10

## 2024-06-10 RX ORDER — FENTANYL CITRATE 50 UG/ML
INJECTION, SOLUTION INTRAMUSCULAR; INTRAVENOUS AS NEEDED
Status: DISCONTINUED | OUTPATIENT
Start: 2024-06-10 | End: 2024-06-10

## 2024-06-10 RX ORDER — SODIUM CHLORIDE 0.9 G/100ML
IRRIGANT IRRIGATION AS NEEDED
Status: DISCONTINUED | OUTPATIENT
Start: 2024-06-10 | End: 2024-06-10 | Stop reason: HOSPADM

## 2024-06-10 RX ORDER — SULFAMETHOXAZOLE AND TRIMETHOPRIM 800; 160 MG/1; MG/1
1 TABLET ORAL 2 TIMES DAILY
Qty: 6 TABLET | Refills: 0 | Status: SHIPPED | OUTPATIENT
Start: 2024-06-10 | End: 2024-06-13

## 2024-06-10 RX ORDER — ACETAMINOPHEN 325 MG/1
650 TABLET ORAL EVERY 6 HOURS PRN
Qty: 30 TABLET | Refills: 0 | Status: SHIPPED | OUTPATIENT
Start: 2024-06-10

## 2024-06-10 RX ORDER — POLYETHYLENE GLYCOL 3350 17 G/17G
17 POWDER, FOR SOLUTION ORAL DAILY
Qty: 10 PACKET | Refills: 0 | Status: SHIPPED | OUTPATIENT
Start: 2024-06-10

## 2024-06-10 RX ORDER — OXYCODONE HYDROCHLORIDE 5 MG/1
5 TABLET ORAL EVERY 4 HOURS PRN
Status: DISCONTINUED | OUTPATIENT
Start: 2024-06-10 | End: 2024-06-10 | Stop reason: HOSPADM

## 2024-06-10 RX ORDER — LIDOCAINE HYDROCHLORIDE 20 MG/ML
INJECTION, SOLUTION EPIDURAL; INFILTRATION; INTRACAUDAL; PERINEURAL AS NEEDED
Status: DISCONTINUED | OUTPATIENT
Start: 2024-06-10 | End: 2024-06-10

## 2024-06-10 RX ORDER — IBUPROFEN 600 MG/1
600 TABLET ORAL EVERY 6 HOURS PRN
Qty: 30 TABLET | Refills: 0 | Status: SHIPPED | OUTPATIENT
Start: 2024-06-10

## 2024-06-10 RX ORDER — ACETAMINOPHEN 325 MG/1
975 TABLET ORAL ONCE
Status: COMPLETED | OUTPATIENT
Start: 2024-06-10 | End: 2024-06-10

## 2024-06-10 RX ORDER — HYDRALAZINE HYDROCHLORIDE 20 MG/ML
5 INJECTION INTRAMUSCULAR; INTRAVENOUS EVERY 30 MIN PRN
Status: DISCONTINUED | OUTPATIENT
Start: 2024-06-10 | End: 2024-06-10 | Stop reason: HOSPADM

## 2024-06-10 RX ORDER — PROPOFOL 10 MG/ML
INJECTION, EMULSION INTRAVENOUS AS NEEDED
Status: DISCONTINUED | OUTPATIENT
Start: 2024-06-10 | End: 2024-06-10

## 2024-06-10 RX ORDER — CEFAZOLIN SODIUM 2 G/100ML
2 INJECTION, SOLUTION INTRAVENOUS ONCE
Status: DISCONTINUED | OUTPATIENT
Start: 2024-06-10 | End: 2024-06-10 | Stop reason: HOSPADM

## 2024-06-10 RX ORDER — MIDAZOLAM HYDROCHLORIDE 1 MG/ML
INJECTION INTRAMUSCULAR; INTRAVENOUS AS NEEDED
Status: DISCONTINUED | OUTPATIENT
Start: 2024-06-10 | End: 2024-06-10

## 2024-06-10 RX ORDER — PHENAZOPYRIDINE HYDROCHLORIDE 100 MG/1
200 TABLET, FILM COATED ORAL ONCE
Status: COMPLETED | OUTPATIENT
Start: 2024-06-10 | End: 2024-06-10

## 2024-06-10 RX ORDER — ONDANSETRON HYDROCHLORIDE 2 MG/ML
4 INJECTION, SOLUTION INTRAVENOUS ONCE AS NEEDED
Status: DISCONTINUED | OUTPATIENT
Start: 2024-06-10 | End: 2024-06-10 | Stop reason: HOSPADM

## 2024-06-10 RX ORDER — OXYCODONE HYDROCHLORIDE 5 MG/1
5 TABLET ORAL EVERY 6 HOURS PRN
Qty: 5 TABLET | Refills: 0 | Status: SHIPPED | OUTPATIENT
Start: 2024-06-10

## 2024-06-10 RX ADMIN — MIDAZOLAM HYDROCHLORIDE 2 MG: 1 INJECTION, SOLUTION INTRAMUSCULAR; INTRAVENOUS at 12:42

## 2024-06-10 RX ADMIN — CARBOXYMETHYLCELLULOSE SODIUM 2 DROP: 0.5 SOLUTION/ DROPS OPHTHALMIC at 12:54

## 2024-06-10 RX ADMIN — LIDOCAINE HYDROCHLORIDE 60 MG: 20 INJECTION, SOLUTION EPIDURAL; INFILTRATION; INTRACAUDAL; PERINEURAL at 12:52

## 2024-06-10 RX ADMIN — CEFAZOLIN 2 G: 1 INJECTION, POWDER, FOR SOLUTION INTRAMUSCULAR; INTRAVENOUS at 12:58

## 2024-06-10 RX ADMIN — PHENAZOPYRIDINE 200 MG: 100 TABLET ORAL at 14:57

## 2024-06-10 RX ADMIN — ACETAMINOPHEN 975 MG: 325 TABLET ORAL at 08:30

## 2024-06-10 RX ADMIN — OXYCODONE HYDROCHLORIDE 5 MG: 5 TABLET ORAL at 14:57

## 2024-06-10 RX ADMIN — ONDANSETRON 4 MG: 2 INJECTION INTRAMUSCULAR; INTRAVENOUS at 13:36

## 2024-06-10 RX ADMIN — PROPOFOL 200 MG: 10 INJECTION, EMULSION INTRAVENOUS at 12:52

## 2024-06-10 RX ADMIN — DEXAMETHASONE SODIUM PHOSPHATE 4 MG: 4 INJECTION, SOLUTION INTRAMUSCULAR; INTRAVENOUS at 12:56

## 2024-06-10 RX ADMIN — FENTANYL CITRATE 100 MCG: 50 INJECTION, SOLUTION INTRAMUSCULAR; INTRAVENOUS at 12:51

## 2024-06-10 RX ADMIN — SODIUM CHLORIDE, POTASSIUM CHLORIDE, SODIUM LACTATE AND CALCIUM CHLORIDE 100 ML/HR: 600; 310; 30; 20 INJECTION, SOLUTION INTRAVENOUS at 08:34

## 2024-06-10 SDOH — HEALTH STABILITY: MENTAL HEALTH: CURRENT SMOKER: 0

## 2024-06-10 ASSESSMENT — PAIN - FUNCTIONAL ASSESSMENT
PAIN_FUNCTIONAL_ASSESSMENT: 0-10

## 2024-06-10 ASSESSMENT — PAIN SCALES - GENERAL
PAINLEVEL_OUTOF10: 3
PAINLEVEL_OUTOF10: 3
PAINLEVEL_OUTOF10: 0 - NO PAIN
PAINLEVEL_OUTOF10: 3
PAINLEVEL_OUTOF10: 3
PAINLEVEL_OUTOF10: 0 - NO PAIN
PAINLEVEL_OUTOF10: 5 - MODERATE PAIN

## 2024-06-10 ASSESSMENT — PAIN DESCRIPTION - DESCRIPTORS
DESCRIPTORS: BURNING

## 2024-06-10 ASSESSMENT — COLUMBIA-SUICIDE SEVERITY RATING SCALE - C-SSRS
1. IN THE PAST MONTH, HAVE YOU WISHED YOU WERE DEAD OR WISHED YOU COULD GO TO SLEEP AND NOT WAKE UP?: NO
2. HAVE YOU ACTUALLY HAD ANY THOUGHTS OF KILLING YOURSELF?: NO
6. HAVE YOU EVER DONE ANYTHING, STARTED TO DO ANYTHING, OR PREPARED TO DO ANYTHING TO END YOUR LIFE?: NO

## 2024-06-10 NOTE — OP NOTE
Holmium Laser Enucleation Transurethral Prostate, Cytsocopy; Botox Inejction ~ 100 Units Operative Note     Date: 6/10/2024  OR Location: [facility]    Name: Robe Fernandez, : 1950, Age: 73 y.o., MRN: [id number], Sex: male    Diagnosis  Pre-op Diagnosis     * Enlarged prostate with urinary obstruction [N40.1, N13.8]     * Urge incontinence [N39.41] Post-op Diagnosis     * Enlarged prostate with urinary obstruction [N40.1, N13.8]     * Urge incontinence [N39.41]     Procedures  Holmium Laser Enucleation Transurethral Prostate  33414 - CA LASER ENUCLEATION PROSTATE W/MORCELLATION    Cytsocopy; Botox Inejction ~ 100 Units  48690 - CA CYSTOURETHROSCOPY INJ CHEMODENERVATION BLADDER      Surgeons      * Ry Perez - Primary    Resident/Fellow/Other Assistant:  Surgeons and Role:  * No surgeons found with a matching role *    Procedure Summary  Anesthesia: General  ASA: II  Anesthesia Staff: Anesthesiologist: Price Sofia MD; Timur Escobar MD  CRNA: TYREL Mcgregor-CRNA  C-AA: LUISANA Draper  Estimated Blood Loss: 25mL  Intra-op Medications:   Administrations occurring from 1300 to 1500 on 06/10/24:   Medication Name Total Dose   sodium chloride 0.9 % irrigation solution 4,000 mL   onabotulinumtoxinA (Botox) injection 100 Units   lactated Ringer's infusion Cannot be calculated              Anesthesia Record               Intraprocedure I/O Totals          Intake    lactated Ringer's infusion 800.00 mL    Total Intake 800 mL       Output    Est. Blood Loss 25 mL    Total Output 25 mL       Net    Net Volume 775 mL          Specimen:   ID Type Source Tests Collected by Time   1 : Prostate Tissue Tissue PROSTATE HOLEP SURGICAL PATHOLOGY EXAM Ry Perez MD 6/10/2024 1402        Staff:   Relief Circulator: Neda  Scrub Person: Jackie  Circulator: Linda  Scrub Person: Hernán         Drains and/or Catheters:   Urethral Catheter Latex 20 Fr. (Active)       Tourniquet Times:         Implants:      Findings: trilobar obstruction.100U botox    Indications: Robe Fernandez is an 73 y.o. male who is having surgery for Enlarged prostate with urinary obstruction [N40.1, N13.8]  Urge incontinence [N39.41].     The patient was seen in the preoperative area. The risks, benefits, complications, treatment options, non-operative alternatives, expected recovery and outcomes were discussed with the patient. The possibilities of reaction to medication, pulmonary aspiration, injury to surrounding structures, bleeding, recurrent infection, the need for additional procedures, failure to diagnose a condition, and creating a complication requiring transfusion or operation were discussed with the patient. The patient concurred with the proposed plan, giving informed consent.  The site of surgery was properly noted/marked if necessary per policy. The patient has been actively warmed in preoperative area. Preoperative antibiotics have been ordered and given within 1 hours of incision. Venous thrombosis prophylaxis have been ordered including bilateral sequential compression devices    Procedure Details:   1. HOLMIUM LASER ENUCLEATION OF THE PROSTATE     Laser Settings Used:  Cutting 2 J, 40 Hz.  Coagulation 1.5 J, 30 Hz.   Hema or Virtual basket used? Hema  Preoperative Prostate Size:  approx 100 cubic centimeters.     Prostate configuration: trilobar  Complication: none  EBL: 25 ml  Catheter: 22Fr 3 way  Antibiotics: ancef  Specimen: Morcellated prostatic tissue.    DESCRIPTION OF PROCEDURE:      The patient was brought tto he OR and after good general anesthesia was achieved, the patient was prepped and draped in dorsal lithotomy position. All pressure points were padded.  Surgical pause was performed.  The patient was identified using 2 identifiers.  The correct surgical procedure was confirmed.  All members of surgical and anesthesia team were in agreement.  The patient received prophylactic antibiotic and the procedure  started.    Cystoscopy: The patient's bladder was first entered with a 22-Angolan rigid cystoscope with 30-degree lens.  Cystoscopic examination showed normal anterior urethra.  The posterior urethra showed trilobar obstruction.  Both ureteral orifices were identified away from the bladder neck. The cystoscope was removed and the meatus was dilated up to 28-Angolan using sounds.  The urethra was then filled with lidocaine gel and a 26-Angolan Rodriguez continuous-flow resectoscope was placed.      Using an adjustable botox injection needle set to 3mm, 100U of botox was injected intradetrusor in 1mL alliquots on the posterior bladder wall. The needle was then removed.    The holmium laser apparatus was placed through the sheath.  Using a 550-micron end-firing quartz laser fiber, a laser bridge, and a 7-Angolan laser stabilizing catheter, the procedure was started with the above-mentioned laser setting.    A en bloc technique was performed with an initial incision at the apex and circumferentially using low energy.  We continued to develop our anterior plane at the apex, identifying the capsule and freeing up the anterior apex well within the sphincter. We then proceeded with our posterior dissection, developing the posterior space toward the bladder neck and continuing our incision laterally to 9 and 3:00.    Then we turned our attention to the lateral attachments of the prostate.  Using laser energy, taking care to avoid any damage to the sphincter, we connected our previously dissected anterior and posterior planes to completely liberate the apex of the prostate preserving the sphincter. We then continued our dissection circumferentially, freeing the prostate systematically from apex to its attachments at the bladder neck. The adenoma was then pushed into the bladder.     Once the prostate had been fully enucleated, the laser was defocused on any sources of bleeding to get additional hemostasis.  There was good hemostasis  at the conclusion of this procedure.  A few small remaining nodular adenomas were then resected from the capsule.      The laser bridge apparatus and the resectoscope were then removed and the offset Rodriguez nephroscope and Rodriguez - Elio tissue morcellator were then introduced and used to completely morcellate the tissue.  Two inflows were used during this portion of the procedure to fully distend the bladder and to prevent any inadvertent bladder injury.  The bladder was then ellik'd out after insertion of the inner sheath and reinspected with the cystoscope showing no residual adenomas.  Hemostasis was ensured at the conclusion of the procedure and both ureteral orifices were confirmed and identified well away from the area of resection.  No residual adenoma could be identified.     Following that, a three-way Rodas catheter on a guide was placed into the bladder and the balloon was filled.  The bladder was irrigated near clear and the continuous irrigation was started.     The patient tolerated the procedure well and was shifted to recovery in good general condition    Complications:  None; patient tolerated the procedure well.    Disposition: PACU - hemodynamically stable.  Condition: stable         Additional Details:     Attending Attestation: I was present and scrubbed for the entire procedure.    Ry Perez  Phone Number: [phone]

## 2024-06-10 NOTE — ANESTHESIA PREPROCEDURE EVALUATION
Patient: Robe Fernandez    Procedure Information       Date/Time: 06/10/24 0930    Procedures:       Holmium Laser Enucleation Transurethral Prostate      Cytsocopy; Botox Inejction ~ 100 Units    Location: [facility] OR  / [facility]    Surgeons: Ry Perez MD            Relevant Problems   Anesthesia (within normal limits)      Cardiac   (+) Dyslipidemia, goal LDL below 100      /Renal   (+) Benign prostatic hyperplasia with urinary frequency   (+) Benign prostatic hyperplasia, presence of lower urinary tract symptoms unspecified   (+) Enlarged prostate with urinary obstruction      Musculoskeletal   (+) Osteoarthritis of first carpometacarpal joint, unspecified       Clinical information reviewed:   Tobacco  Allergies  Meds   Med Hx  Surg Hx   Fam Hx  Soc Hx        NPO Detail:  NPO/Void Status  Date of Last Liquid: 06/10/24  Time of Last Liquid: 0700  Date of Last Solid: 06/09/24  Time of Last Solid: 1900  Last Intake Type: Clear fluids  Time of Last Void: 0813         Physical Exam    Airway  Mallampati: III  TM distance: >3 FB  Neck ROM: full     Cardiovascular    Dental    Pulmonary    Abdominal            Anesthesia Plan    History of general anesthesia?: yes  History of complications of general anesthesia?: no    ASA 2     general     The patient is not a current smoker.    intravenous induction   Anesthetic plan and risks discussed with patient.    Plan discussed with CRNA.

## 2024-06-10 NOTE — PERIOPERATIVE NURSING NOTE
1412 - patient arrived to PACU bay, in stable condition, with anesthesia and procedure RN. Procedure explained. Plan reviewed.     1424 - Patient family updated via text messaging at this time. Patient tolerating small sips of water and pretzels.     1438 - Patient  updated via telephone at this time.     1443 - Dr. Perez at bedside.     1457 - Patient medicated per emr orders at this time.     1514 - phase I care complete at this time.     1515 - Patient brought to Phase II in stable condition at this time.

## 2024-06-10 NOTE — DISCHARGE INSTRUCTIONS
DEPARTMENT OF UROLOGY  DISCHARGE INSTRUCTIONS [facility]  Outpatient Surgery    C O N F I D E N T I A L   I N F O R M A T I O N    Robe Fernandez    Call [phone] during regular daytime business hours (8:00 am - 5:00 pm) and after 5:00 pm and ask for the Urology resident with any questions or concerns.      If it is a life-threatening situation, proceed to the nearest emergency department.        Follow-up appointment:  6/12/26 (catheter removal), 6/26/24 (post-operative check-up)     Thank you for the opportunity to care for you today.  Your health and healing are very important to us.  We hope we made you feel as comfortable as possible and are committed to your recovery and continued well-being.      The following is a brief overview of your procedure (holmium laser enucleation of the prostate) today. Some of the information contained on this summary may be confidential.  This information should be kept in your records and should be shared with your regular doctor.    Physicians:   Dr. Perez      Procedure performed: Prostate Resection  Pending results:   pathology of tissue taken from your prostate    What to Expect During your Recovery and Home Care  Anesthesia Side Effects   You received anesthesia today.  You may feel sleepy, tired, or have a sore throat.   You may also feel drowsiness, dizziness, or inability to think clearly.  For your safety, do not drive, drink alcoholic beverages, take any unprescribed medication or make any important decisions for 24 hours.  A responsible adult should be with you for 24 hours.        Activity and Recovery    No heavy lifting over ten pounds. Limit activity while urinary catheter is in place. Avoid activities that would cause pulling or tugging on your catheter.    Do not drive or operate heavy machinery while taking narcotic pain medications as these medications can alter perception, impair judgement, and slow reaction times.    Pain Control  Unfortunately, you may  experience pain after your procedure.  Adequate management can include alternative measures to help ease your pain and can include over the counter Tylenol / Ibuprofen and hot packs. Do not take more than 4,000mg of Tylenol in a 24-hour period.  Oxycodone can be taken as prescribed as needed for breakthrough pain. Oxycodone is a narcotic and should only be taken for pain that cannot be controlled with other medications. Narcotics cause constipation, take a stool softener (such as Miralax) if using this medication.    You may have been prescribed pyridium (Azo) which can help with burning with urination. Pyridium turns your urine bright orange or rust colored, this is normal. You may also experience bladder spasms due to the catheter. Oxybutynin (Ditropan) may be prescribed to help alleviate this problem.    Nausea/Vomiting   Clear liquids are best tolerated at first. Start slow, advance your diet as tolerated to normal foods. Avoid spicy, greasy, heavy foods at first. Also, you may feel nauseous or like you need to vomit if you take any type of medication on an empty stomach.  Call your physician if you are unable to eat or drink and have persistent vomiting.    Signs of Bleeding   You are going to have some blood in your urine. Your urine will be light pink to yellow. You always want to look at the urine in the tubing of your catheter and not in the large urine collection bag to check for bleeding. If urine becomes thick dark conner red, has large clots or stops draining, please notify your physician.    Wait until three days after your surgery to resume your blood thinner medication.    Treatment/wound care:   Keep area(s) clean and dry. Clean around the tip or your penis were the catheter goes in daily with mild soap and water.  It is okay to shower 24 hours after time of surgery.    Do not submerge your catheter in standing water until seen for follow up appointment (no tub bathing, swimming, or hot tubs).       Signs of Infection  Signs of infection can include fever, drainage(green/yellow), chills, burning sensation with passing of urine, catheter leakage, or severe abdominal pain.  If you see any of these occur, please contact your doctor's office at [phone].  Any fever higher than 100.4, especially if associated with an ill feeling, abdominal pain, chills, or nausea should be reported to your surgeon.      Assist in bowel movements/urination  Increase fiber in diet  Increase water (6 to 8 glasses)  Increase walking     Additional Instructions: CATHETER CARE  Always keep the catheters tubing and drainage bag below the level of your bladder.  Avoid loops and kinks in the catheter tubing.  NOTIFY your physician if catheter falls out or catheter seems clogged and urine is not draining.   Do not wear the small leg bag to bed you should be provided with a larger overnight bag that you should wear to bed and can hang over the side of the bed.  We recommend wearing the large bag in the shower, as this is easy to dry, and you do not get your leg straps wet from your leg bag.   Your catheter should be secured to your upper thigh, do not allow it to hang or dangle.  Your catheter will be removed at your post-operative appointment.

## 2024-06-10 NOTE — ANESTHESIA POSTPROCEDURE EVALUATION
Patient: Robe Fernandez    Procedure Summary       Date: 06/10/24 Room / Location: [facility] A OR 01 / Virtual U A OR    Anesthesia Start: 1242 Anesthesia Stop: 1416    Procedures:       Holmium Laser Enucleation Transurethral Prostate      Cytsocopy; Botox Inejction ~ 100 Units Diagnosis:       Enlarged prostate with urinary obstruction      Urge incontinence      (Enlarged prostate with urinary obstruction [N40.1, N13.8])      (Urge incontinence [N39.41])    Surgeons: Ry Perez MD Responsible Provider: Price Sofia MD    Anesthesia Type: general ASA Status: 2            Anesthesia Type: general    Vitals Value Taken Time   /77 06/10/24 1446   Temp 36.1 °C (97 °F) 06/10/24 1412   Pulse 69 06/10/24 1459   Resp 18 06/10/24 1445   SpO2 95 % 06/10/24 1459   Vitals shown include unfiled device data.    Anesthesia Post Evaluation    Patient location during evaluation: PACU  Patient participation: complete - patient participated  Level of consciousness: awake  Pain management: adequate  Airway patency: patent  Cardiovascular status: acceptable  Respiratory status: acceptable  Hydration status: acceptable  Postoperative Nausea and Vomiting: none        No notable events documented.

## 2024-06-10 NOTE — ANESTHESIA PROCEDURE NOTES
Airway  Date/Time: 6/10/2024 12:54 PM  Urgency: elective    Airway not difficult    Staffing  Performed: CRNA   Authorized by: CRYSTAL Mcgregor    Performed by: CRYSTAL Mcgregor  Patient location during procedure: OR    Indications and Patient Condition  Indications for airway management: anesthesia  Spontaneous Ventilation: absent  Sedation level: deep  Preoxygenated: yes  Patient position: sniffing  Mask difficulty assessment: 1 - vent by mask    Final Airway Details  Final airway type: supraglottic airway      Successful airway: Supraglottic airway: i gel.  Size 4     Number of attempts at approach: 1  Number of other approaches attempted: 0

## 2024-06-11 ENCOUNTER — TELEPHONE (OUTPATIENT)
Dept: UROLOGY | Facility: HOSPITAL | Age: 74
End: 2024-06-11
Payer: MEDICARE

## 2024-06-11 ASSESSMENT — PAIN SCALES - GENERAL: PAINLEVEL_OUTOF10: 0 - NO PAIN

## 2024-06-11 NOTE — TELEPHONE ENCOUNTER
Patient called with surgery questions. I called patient at [phone]. Patient asked questions in regards to catheter care. Patient questions were answered and patients understands catheter care.

## 2024-06-12 ENCOUNTER — APPOINTMENT (OUTPATIENT)
Dept: UROLOGY | Facility: HOSPITAL | Age: 74
End: 2024-06-12
Payer: MEDICARE

## 2024-06-12 DIAGNOSIS — N13.8 BENIGN PROSTATIC HYPERPLASIA WITH URINARY OBSTRUCTION: Primary | ICD-10-CM

## 2024-06-12 DIAGNOSIS — N40.1 BENIGN PROSTATIC HYPERPLASIA WITH URINARY OBSTRUCTION: Primary | ICD-10-CM

## 2024-06-12 PROCEDURE — 99024 POSTOP FOLLOW-UP VISIT: CPT | Performed by: UROLOGY

## 2024-06-12 PROCEDURE — 51700 IRRIGATION OF BLADDER: CPT | Performed by: UROLOGY

## 2024-06-12 PROCEDURE — 1123F ACP DISCUSS/DSCN MKR DOCD: CPT | Performed by: UROLOGY

## 2024-06-12 NOTE — PROGRESS NOTES
HPI    73 y.o. male being seen with the following problem list:    Problem list:  1.BPH, both obstructive and irritative symptoms, s/p HoLEP + botox 100U 6/10/24    06/12/24 - seen for TOV, passed easily    Lab Results   Component Value Date    PSA 5.22 (H) 08/30/2023    PSA 6.09 (H) 04/25/2023    PSA 6.60 (H) 11/15/2021    PSA 5.10 (H) 11/01/2021    PSA 3.39 06/27/2020         Current Medications:  Current Outpatient Medications   Medication Sig Dispense Refill    acetaminophen (Tylenol) 325 mg tablet Take 2 tablets (650 mg) by mouth every 6 hours if needed for mild pain (1 - 3). 30 tablet 0    aspirin 81 mg EC tablet Take 1 tablet (81 mg) by mouth once daily.      atorvastatin (Lipitor) 40 mg tablet Take 1 tablet (40 mg) by mouth once daily at bedtime. 90 tablet 3    azelastine 205.5 mcg (0.15 %) spray,non-aerosol Administer 2 sprays into affected nostril(s) once daily.      docusate sodium (Colace) 50 mg capsule Take 2 capsules (100 mg) by mouth 2 times a day.      fexofenadine (Allegra) 180 mg tablet Take by mouth.      fluticasone (Flonase Sensimist) 27.5 mcg/actuation nasal spray Administer into affected nostril(s).      glucosamine sanchez 2KCl-chondroit 500-400 mg tablet Take 1 tablet by mouth once daily.      ibuprofen 600 mg tablet Take 1 tablet (600 mg) by mouth every 6 hours if needed for mild pain (1 - 3). 30 tablet 0    Lactobacillus acidophilus 500 million cell tablet Take 1 tablet by mouth if needed.      multivitamin tablet Take 1 tablet by mouth once daily.      oxyCODONE (Roxicodone) 5 mg immediate release tablet Take 1 tablet (5 mg) by mouth every 6 hours if needed for severe pain (7 - 10). 5 tablet 0    phenazopyridine (Pyridium) 200 mg tablet Take 1 tablet (200 mg) by mouth 3 times a day as needed for bladder spasms. 10 tablet 0    polyethylene glycol (Glycolax, Miralax) 17 gram packet Take 17 g by mouth once daily. 10 packet 0    sildenafil (Revatio) 20 mg tablet Use 1-2 daily prn 75 tablet 11     sulfamethoxazole-trimethoprim (Bactrim DS) 800-160 mg tablet Take 1 tablet by mouth 2 times a day for 3 days. 6 tablet 0    vitamins A,C,E-zinc-copper (ICaps AREDS) 7,160-113-100 unit-mg-unit tablet,delayed release (DR/EC) Take by mouth.       No current facility-administered medications for this visit.        Active Problems:  Robe Fernandez is a 73 y.o. male with the following Problems and Medications.  Patient Active Problem List   Diagnosis    Allergic rhinitis due to pollen    Benign prostatic hyperplasia, presence of lower urinary tract symptoms unspecified    Blood pressure elevated without history of HTN    Diverticulosis of colon    Dyslipidemia, goal LDL below 100    Elevated PSA    Hesitancy of micturition    Osteoarthritis of first carpometacarpal joint, unspecified    Tremor, essential    Weak urinary stream    Wears glasses    Inflammatory spondylopathy, unspecified spinal region (CMS-HCC)    Arthritis of both knees    Impotence of organic origin    Agatston coronary artery calcium score greater than 400    OAB (overactive bladder)    Benign prostatic hyperplasia with urinary frequency    Enlarged prostate with urinary obstruction    Urge incontinence     Current Outpatient Medications   Medication Sig Dispense Refill    acetaminophen (Tylenol) 325 mg tablet Take 2 tablets (650 mg) by mouth every 6 hours if needed for mild pain (1 - 3). 30 tablet 0    aspirin 81 mg EC tablet Take 1 tablet (81 mg) by mouth once daily.      atorvastatin (Lipitor) 40 mg tablet Take 1 tablet (40 mg) by mouth once daily at bedtime. 90 tablet 3    azelastine 205.5 mcg (0.15 %) spray,non-aerosol Administer 2 sprays into affected nostril(s) once daily.      docusate sodium (Colace) 50 mg capsule Take 2 capsules (100 mg) by mouth 2 times a day.      fexofenadine (Allegra) 180 mg tablet Take by mouth.      fluticasone (Flonase Sensimist) 27.5 mcg/actuation nasal spray Administer into affected nostril(s).      glucosamine sanchez  2KCl-chondroit 500-400 mg tablet Take 1 tablet by mouth once daily.      ibuprofen 600 mg tablet Take 1 tablet (600 mg) by mouth every 6 hours if needed for mild pain (1 - 3). 30 tablet 0    Lactobacillus acidophilus 500 million cell tablet Take 1 tablet by mouth if needed.      multivitamin tablet Take 1 tablet by mouth once daily.      oxyCODONE (Roxicodone) 5 mg immediate release tablet Take 1 tablet (5 mg) by mouth every 6 hours if needed for severe pain (7 - 10). 5 tablet 0    phenazopyridine (Pyridium) 200 mg tablet Take 1 tablet (200 mg) by mouth 3 times a day as needed for bladder spasms. 10 tablet 0    polyethylene glycol (Glycolax, Miralax) 17 gram packet Take 17 g by mouth once daily. 10 packet 0    sildenafil (Revatio) 20 mg tablet Use 1-2 daily prn 75 tablet 11    sulfamethoxazole-trimethoprim (Bactrim DS) 800-160 mg tablet Take 1 tablet by mouth 2 times a day for 3 days. 6 tablet 0    vitamins A,C,E-zinc-copper (ICaps AREDS) 7,160-113-100 unit-mg-unit tablet,delayed release (DR/EC) Take by mouth.       No current facility-administered medications for this visit.       PMH:  Past Medical History:   Diagnosis Date    Allergic rhinitis     BPH (benign prostatic hyperplasia)     Coronary artery disease     8/2023: CT calcium score 1673 saw Dr. Perez, no stress test needed d/t very active.  Had AAA screening d/t family history which was negative, no further testing needed.    Diverticulosis of colon     Elevated BP without diagnosis of hypertension     home BPs WNL    Elevated PSA     Hyperlipidemia     OAB (overactive bladder)     Osteoarthritis     Tremor, essential     resolved per pt       PSH:  Past Surgical History:   Procedure Laterality Date    COLONOSCOPY      KNEE SURGERY Bilateral     partial replacement    TONSILLECTOMY      WRIST SURGERY Left     lunate bone surgery       FMH:  Family History   Problem Relation Name Age of Onset    Transient ischemic attack Father      Stroke Father       Aortic aneurysm Father      Prostate cancer Brother      Prostate cancer Brother         SHx:  Social History     Tobacco Use    Smoking status: Never    Smokeless tobacco: Never   Vaping Use    Vaping status: Never Used   Substance Use Topics    Alcohol use: Yes     Comment: once/month    Drug use: Never       Allergies:  No Known Allergies      Assessment/Plan  Successful TOV, discussed postop precautions, expectations. Will see in 2 weeks for PVR, symptom check, sooner prn        Scribe Attestation  By signing my name below, I, Little Chambers, Scribe, attest that this documentation  has been prepared under the direction and in the presence of Ry Perez MD.

## 2024-06-13 LAB
LABORATORY COMMENT REPORT: NORMAL
PATH REPORT.FINAL DX SPEC: NORMAL
PATH REPORT.GROSS SPEC: NORMAL
PATH REPORT.RELEVANT HX SPEC: NORMAL
PATH REPORT.TOTAL CANCER: NORMAL

## 2024-06-26 ENCOUNTER — TELEMEDICINE (OUTPATIENT)
Dept: UROLOGY | Facility: HOSPITAL | Age: 74
End: 2024-06-26
Payer: MEDICARE

## 2024-06-26 DIAGNOSIS — N32.81 OAB (OVERACTIVE BLADDER): Primary | ICD-10-CM

## 2024-06-26 DIAGNOSIS — R39.12 WEAK URINARY STREAM: ICD-10-CM

## 2024-06-26 DIAGNOSIS — N13.8 BENIGN PROSTATIC HYPERPLASIA WITH URINARY OBSTRUCTION: ICD-10-CM

## 2024-06-26 DIAGNOSIS — N40.1 BENIGN PROSTATIC HYPERPLASIA WITH URINARY OBSTRUCTION: ICD-10-CM

## 2024-06-26 PROCEDURE — 1123F ACP DISCUSS/DSCN MKR DOCD: CPT | Performed by: UROLOGY

## 2024-06-26 PROCEDURE — 99024 POSTOP FOLLOW-UP VISIT: CPT | Performed by: UROLOGY

## 2024-06-26 NOTE — PROGRESS NOTES
HPI    73 y.o. male being seen with the following problem list:    Problem list:  1.BPH, both obstructive and irritative symptoms, s/p HoLEP + botox 100U 6/10/24. Path 60g, benign.    6/12/24 - seen for TOV, passed easily    6/26/24 - seen today for 2 week symptom check. Strong stream, emptying well. Reports significant improvement in his UUI, although still having very minimal urge leakage. Overall, happy with where he is at.    Lab Results   Component Value Date    PSA 5.22 (H) 08/30/2023    PSA 6.09 (H) 04/25/2023    PSA 6.60 (H) 11/15/2021    PSA 5.10 (H) 11/01/2021    PSA 3.39 06/27/2020         Current Medications:  Current Outpatient Medications   Medication Sig Dispense Refill    acetaminophen (Tylenol) 325 mg tablet Take 2 tablets (650 mg) by mouth every 6 hours if needed for mild pain (1 - 3). 30 tablet 0    aspirin 81 mg EC tablet Take 1 tablet (81 mg) by mouth once daily.      atorvastatin (Lipitor) 40 mg tablet Take 1 tablet (40 mg) by mouth once daily at bedtime. 90 tablet 3    azelastine 205.5 mcg (0.15 %) spray,non-aerosol Administer 2 sprays into affected nostril(s) once daily.      docusate sodium (Colace) 50 mg capsule Take 2 capsules (100 mg) by mouth 2 times a day.      fexofenadine (Allegra) 180 mg tablet Take by mouth.      fluticasone (Flonase Sensimist) 27.5 mcg/actuation nasal spray Administer into affected nostril(s).      glucosamine sanchez 2KCl-chondroit 500-400 mg tablet Take 1 tablet by mouth once daily.      ibuprofen 600 mg tablet Take 1 tablet (600 mg) by mouth every 6 hours if needed for mild pain (1 - 3). 30 tablet 0    Lactobacillus acidophilus 500 million cell tablet Take 1 tablet by mouth if needed.      multivitamin tablet Take 1 tablet by mouth once daily.      oxyCODONE (Roxicodone) 5 mg immediate release tablet Take 1 tablet (5 mg) by mouth every 6 hours if needed for severe pain (7 - 10). 5 tablet 0    phenazopyridine (Pyridium) 200 mg tablet Take 1 tablet (200 mg) by mouth 3  times a day as needed for bladder spasms. 10 tablet 0    polyethylene glycol (Glycolax, Miralax) 17 gram packet Take 17 g by mouth once daily. 10 packet 0    sildenafil (Revatio) 20 mg tablet Use 1-2 daily prn 75 tablet 11    vitamins A,C,E-zinc-copper (ICaps AREDS) 7,160-113-100 unit-mg-unit tablet,delayed release (DR/EC) Take by mouth.       No current facility-administered medications for this visit.        Active Problems:  Robe Fernandez is a 73 y.o. male with the following Problems and Medications.  Patient Active Problem List   Diagnosis    Allergic rhinitis due to pollen    Benign prostatic hyperplasia, presence of lower urinary tract symptoms unspecified    Blood pressure elevated without history of HTN    Diverticulosis of colon    Dyslipidemia, goal LDL below 100    Elevated PSA    Hesitancy of micturition    Osteoarthritis of first carpometacarpal joint, unspecified    Tremor, essential    Weak urinary stream    Wears glasses    Inflammatory spondylopathy, unspecified spinal region (CMS-HCC)    Arthritis of both knees    Impotence of organic origin    Agatston coronary artery calcium score greater than 400    OAB (overactive bladder)    Benign prostatic hyperplasia with urinary frequency    Enlarged prostate with urinary obstruction    Urge incontinence     Current Outpatient Medications   Medication Sig Dispense Refill    acetaminophen (Tylenol) 325 mg tablet Take 2 tablets (650 mg) by mouth every 6 hours if needed for mild pain (1 - 3). 30 tablet 0    aspirin 81 mg EC tablet Take 1 tablet (81 mg) by mouth once daily.      atorvastatin (Lipitor) 40 mg tablet Take 1 tablet (40 mg) by mouth once daily at bedtime. 90 tablet 3    azelastine 205.5 mcg (0.15 %) spray,non-aerosol Administer 2 sprays into affected nostril(s) once daily.      docusate sodium (Colace) 50 mg capsule Take 2 capsules (100 mg) by mouth 2 times a day.      fexofenadine (Allegra) 180 mg tablet Take by mouth.      fluticasone (Flonase  Sensimist) 27.5 mcg/actuation nasal spray Administer into affected nostril(s).      glucosamine sanchez 2KCl-chondroit 500-400 mg tablet Take 1 tablet by mouth once daily.      ibuprofen 600 mg tablet Take 1 tablet (600 mg) by mouth every 6 hours if needed for mild pain (1 - 3). 30 tablet 0    Lactobacillus acidophilus 500 million cell tablet Take 1 tablet by mouth if needed.      multivitamin tablet Take 1 tablet by mouth once daily.      oxyCODONE (Roxicodone) 5 mg immediate release tablet Take 1 tablet (5 mg) by mouth every 6 hours if needed for severe pain (7 - 10). 5 tablet 0    phenazopyridine (Pyridium) 200 mg tablet Take 1 tablet (200 mg) by mouth 3 times a day as needed for bladder spasms. 10 tablet 0    polyethylene glycol (Glycolax, Miralax) 17 gram packet Take 17 g by mouth once daily. 10 packet 0    sildenafil (Revatio) 20 mg tablet Use 1-2 daily prn 75 tablet 11    vitamins A,C,E-zinc-copper (ICaps AREDS) 7,160-113-100 unit-mg-unit tablet,delayed release (DR/EC) Take by mouth.       No current facility-administered medications for this visit.       PMH:  Past Medical History:   Diagnosis Date    Allergic rhinitis     BPH (benign prostatic hyperplasia)     Coronary artery disease     8/2023: CT calcium score 1673 saw Dr. Perez, no stress test needed d/t very active.  Had AAA screening d/t family history which was negative, no further testing needed.    Diverticulosis of colon     Elevated BP without diagnosis of hypertension     home BPs WNL    Elevated PSA     Hyperlipidemia     OAB (overactive bladder)     Osteoarthritis     Tremor, essential     resolved per pt       PSH:  Past Surgical History:   Procedure Laterality Date    COLONOSCOPY      KNEE SURGERY Bilateral     partial replacement    TONSILLECTOMY      WRIST SURGERY Left     lunate bone surgery       FMH:  Family History   Problem Relation Name Age of Onset    Transient ischemic attack Father      Stroke Father      Aortic aneurysm Father       Prostate cancer Brother      Prostate cancer Brother         SHx:  Social History     Tobacco Use    Smoking status: Never    Smokeless tobacco: Never   Vaping Use    Vaping status: Never Used   Substance Use Topics    Alcohol use: Yes     Comment: once/month    Drug use: Never       Allergies:  No Known Allergies      Assessment/Plan  Doing well with improved UUI, and a strong stream, 2 weeks out. We will follow up in 3 months for a symptom check.        Scribe Attestation  By signing my name below, ILittle Scribe, attest that this documentation  has been prepared under the direction and in the presence of Ry Perez MD.

## 2024-08-12 DIAGNOSIS — E78.5 DYSLIPIDEMIA, GOAL LDL BELOW 100: ICD-10-CM

## 2024-08-12 RX ORDER — ATORVASTATIN CALCIUM 40 MG/1
40 TABLET, FILM COATED ORAL NIGHTLY
Qty: 90 TABLET | Refills: 3 | Status: SHIPPED | OUTPATIENT
Start: 2024-08-12 | End: 2024-08-12 | Stop reason: SDUPTHER

## 2024-08-12 RX ORDER — ATORVASTATIN CALCIUM 40 MG/1
40 TABLET, FILM COATED ORAL NIGHTLY
Qty: 90 TABLET | Refills: 3 | Status: SHIPPED | OUTPATIENT
Start: 2024-08-12 | End: 2025-08-12

## 2024-09-24 NOTE — PROGRESS NOTES
HPI    74 y.o. male being seen with the following problem list:    Problem list:  BPH, both obstructive and irritative symptoms, s/p HoLEP + botox 100U 6/10/24. Path 60g, benign.     6/12/24 - seen for TOV, passed easily     6/26/24 - seen today for 2 week symptom check. Strong stream, emptying well. Reports significant improvement in his UUI, although still having very minimal urge leakage. Overall, happy with where he is at.    09/26/24 - PVR 5cc. Doing well, urinary symptoms have improved significantly from before surgery. Reports rare episodes of frequency and urgency that come and go, really not that bothersome, happens very rarely.  No incontinence.  Very happy with his outcome.      Lab Results   Component Value Date    PSA 5.22 (H) 08/30/2023    PSA 6.09 (H) 04/25/2023    PSA 6.60 (H) 11/15/2021    PSA 5.10 (H) 11/01/2021    PSA 3.39 06/27/2020         Current Medications:  Current Outpatient Medications   Medication Sig Dispense Refill    acetaminophen (Tylenol) 325 mg tablet Take 2 tablets (650 mg) by mouth every 6 hours if needed for mild pain (1 - 3). 30 tablet 0    aspirin 81 mg EC tablet Take 1 tablet (81 mg) by mouth once daily.      atorvastatin (Lipitor) 40 mg tablet Take 1 tablet (40 mg) by mouth once daily at bedtime. For cholesterol 90 tablet 3    azelastine 205.5 mcg (0.15 %) spray,non-aerosol Administer 2 sprays into affected nostril(s) once daily.      docusate sodium (Colace) 50 mg capsule Take 2 capsules (100 mg) by mouth 2 times a day.      fexofenadine (Allegra) 180 mg tablet Take by mouth.      fluticasone (Flonase Sensimist) 27.5 mcg/actuation nasal spray Administer into affected nostril(s).      glucosamine sanchez 2KCl-chondroit 500-400 mg tablet Take 1 tablet by mouth once daily.      ibuprofen 600 mg tablet Take 1 tablet (600 mg) by mouth every 6 hours if needed for mild pain (1 - 3). 30 tablet 0    Lactobacillus acidophilus 500 million cell tablet Take 1 tablet by mouth if needed.       multivitamin tablet Take 1 tablet by mouth once daily.      oxyCODONE (Roxicodone) 5 mg immediate release tablet Take 1 tablet (5 mg) by mouth every 6 hours if needed for severe pain (7 - 10). 5 tablet 0    phenazopyridine (Pyridium) 200 mg tablet Take 1 tablet (200 mg) by mouth 3 times a day as needed for bladder spasms. 10 tablet 0    polyethylene glycol (Glycolax, Miralax) 17 gram packet Take 17 g by mouth once daily. 10 packet 0    sildenafil (Revatio) 20 mg tablet Use 1-2 daily prn 75 tablet 11    vitamins A,C,E-zinc-copper (ICaps AREDS) 7,160-113-100 unit-mg-unit tablet,delayed release (DR/EC) Take by mouth.       No current facility-administered medications for this visit.        Active Problems:  Robe Fernandez is a 74 y.o. male with the following Problems and Medications.  Patient Active Problem List   Diagnosis    Allergic rhinitis due to pollen    Benign prostatic hyperplasia, presence of lower urinary tract symptoms unspecified    Blood pressure elevated without history of HTN    Diverticulosis of colon    Dyslipidemia, goal LDL below 100    Elevated PSA    Hesitancy of micturition    Osteoarthritis of first carpometacarpal joint, unspecified    Tremor, essential    Weak urinary stream    Wears glasses    Inflammatory spondylopathy, unspecified spinal region (CMS-HCC)    Arthritis of both knees    Impotence of organic origin    Agatston coronary artery calcium score greater than 400    OAB (overactive bladder)    Benign prostatic hyperplasia with urinary frequency    Benign prostatic hyperplasia with urinary obstruction    Urge incontinence     Current Outpatient Medications   Medication Sig Dispense Refill    acetaminophen (Tylenol) 325 mg tablet Take 2 tablets (650 mg) by mouth every 6 hours if needed for mild pain (1 - 3). 30 tablet 0    aspirin 81 mg EC tablet Take 1 tablet (81 mg) by mouth once daily.      atorvastatin (Lipitor) 40 mg tablet Take 1 tablet (40 mg) by mouth once daily at bedtime. For  cholesterol 90 tablet 3    azelastine 205.5 mcg (0.15 %) spray,non-aerosol Administer 2 sprays into affected nostril(s) once daily.      docusate sodium (Colace) 50 mg capsule Take 2 capsules (100 mg) by mouth 2 times a day.      fexofenadine (Allegra) 180 mg tablet Take by mouth.      fluticasone (Flonase Sensimist) 27.5 mcg/actuation nasal spray Administer into affected nostril(s).      glucosamine sanchez 2KCl-chondroit 500-400 mg tablet Take 1 tablet by mouth once daily.      ibuprofen 600 mg tablet Take 1 tablet (600 mg) by mouth every 6 hours if needed for mild pain (1 - 3). 30 tablet 0    Lactobacillus acidophilus 500 million cell tablet Take 1 tablet by mouth if needed.      multivitamin tablet Take 1 tablet by mouth once daily.      oxyCODONE (Roxicodone) 5 mg immediate release tablet Take 1 tablet (5 mg) by mouth every 6 hours if needed for severe pain (7 - 10). 5 tablet 0    phenazopyridine (Pyridium) 200 mg tablet Take 1 tablet (200 mg) by mouth 3 times a day as needed for bladder spasms. 10 tablet 0    polyethylene glycol (Glycolax, Miralax) 17 gram packet Take 17 g by mouth once daily. 10 packet 0    sildenafil (Revatio) 20 mg tablet Use 1-2 daily prn 75 tablet 11    vitamins A,C,E-zinc-copper (ICaps AREDS) 7,160-113-100 unit-mg-unit tablet,delayed release (DR/EC) Take by mouth.       No current facility-administered medications for this visit.       PMH:  Past Medical History:   Diagnosis Date    Allergic rhinitis     BPH (benign prostatic hyperplasia)     Coronary artery disease     8/2023: CT calcium score 1673 saw Dr. Perez, no stress test needed d/t very active.  Had AAA screening d/t family history which was negative, no further testing needed.    Diverticulosis of colon     Elevated BP without diagnosis of hypertension     home BPs WNL    Elevated PSA     Hyperlipidemia     OAB (overactive bladder)     Osteoarthritis     Tremor, essential     resolved per pt       PSH:  Past Surgical History:    Procedure Laterality Date    COLONOSCOPY      KNEE SURGERY Bilateral     partial replacement    TONSILLECTOMY      WRIST SURGERY Left     lunate bone surgery       FMH:  Family History   Problem Relation Name Age of Onset    Transient ischemic attack Father      Stroke Father      Aortic aneurysm Father      Prostate cancer Brother      Prostate cancer Brother         SHx:  Social History     Tobacco Use    Smoking status: Never    Smokeless tobacco: Never   Vaping Use    Vaping status: Never Used   Substance Use Topics    Alcohol use: Yes     Comment: once/month    Drug use: Never       Allergies:  No Known Allergies    Assessment/Plan  About 3 months s/p HoLEP. Doing well, urgency and frequency have improved significantly.  Rare episodes of frequency urgency, but not too bothered by this. Overall very happy.     Will follow up in 6 months for symptom check over telehealth    Scribe Attestation  By signing my name below, I, Renato Payan, attest that this documentation  has been prepared under the direction and in the presence of Ry Perez MD.

## 2024-09-26 ENCOUNTER — OFFICE VISIT (OUTPATIENT)
Dept: UROLOGY | Facility: HOSPITAL | Age: 74
End: 2024-09-26
Payer: MEDICARE

## 2024-09-26 DIAGNOSIS — R35.0 BENIGN PROSTATIC HYPERPLASIA WITH URINARY FREQUENCY: Primary | ICD-10-CM

## 2024-09-26 DIAGNOSIS — N40.1 BENIGN PROSTATIC HYPERPLASIA WITH URINARY FREQUENCY: Primary | ICD-10-CM

## 2024-09-26 DIAGNOSIS — R39.15 URINARY URGENCY: ICD-10-CM

## 2024-09-26 PROCEDURE — 99213 OFFICE O/P EST LOW 20 MIN: CPT | Performed by: UROLOGY

## 2024-09-26 PROCEDURE — 1123F ACP DISCUSS/DSCN MKR DOCD: CPT | Performed by: UROLOGY

## 2024-09-27 ENCOUNTER — APPOINTMENT (OUTPATIENT)
Dept: UROLOGY | Facility: HOSPITAL | Age: 74
End: 2024-09-27
Payer: MEDICARE

## 2024-11-04 ENCOUNTER — APPOINTMENT (OUTPATIENT)
Dept: PRIMARY CARE | Facility: CLINIC | Age: 74
End: 2024-11-04
Payer: MEDICARE

## 2024-11-04 VITALS
DIASTOLIC BLOOD PRESSURE: 78 MMHG | HEIGHT: 68 IN | HEART RATE: 80 BPM | WEIGHT: 191 LBS | TEMPERATURE: 97.3 F | BODY MASS INDEX: 28.95 KG/M2 | OXYGEN SATURATION: 94 % | SYSTOLIC BLOOD PRESSURE: 120 MMHG

## 2024-11-04 DIAGNOSIS — Z71.85 IMMUNIZATION COUNSELING: ICD-10-CM

## 2024-11-04 DIAGNOSIS — N40.0 BENIGN PROSTATIC HYPERPLASIA, PRESENCE OF LOWER URINARY TRACT SYMPTOMS UNSPECIFIED: ICD-10-CM

## 2024-11-04 DIAGNOSIS — R03.0 BLOOD PRESSURE ELEVATED WITHOUT HISTORY OF HTN: ICD-10-CM

## 2024-11-04 DIAGNOSIS — E78.5 DYSLIPIDEMIA, GOAL LDL BELOW 100: ICD-10-CM

## 2024-11-04 DIAGNOSIS — Z00.00 ROUTINE GENERAL MEDICAL EXAMINATION AT HEALTH CARE FACILITY: Primary | ICD-10-CM

## 2024-11-04 DIAGNOSIS — E55.9 VITAMIN D DEFICIENCY: ICD-10-CM

## 2024-11-04 DIAGNOSIS — N52.9 IMPOTENCE OF ORGANIC ORIGIN: ICD-10-CM

## 2024-11-04 PROCEDURE — 1160F RVW MEDS BY RX/DR IN RCRD: CPT | Performed by: INTERNAL MEDICINE

## 2024-11-04 PROCEDURE — 1159F MED LIST DOCD IN RCRD: CPT | Performed by: INTERNAL MEDICINE

## 2024-11-04 PROCEDURE — 1123F ACP DISCUSS/DSCN MKR DOCD: CPT | Performed by: INTERNAL MEDICINE

## 2024-11-04 PROCEDURE — 99214 OFFICE O/P EST MOD 30 MIN: CPT | Performed by: INTERNAL MEDICINE

## 2024-11-04 PROCEDURE — 3008F BODY MASS INDEX DOCD: CPT | Performed by: INTERNAL MEDICINE

## 2024-11-04 PROCEDURE — G0439 PPPS, SUBSEQ VISIT: HCPCS | Performed by: INTERNAL MEDICINE

## 2024-11-04 PROCEDURE — 1170F FXNL STATUS ASSESSED: CPT | Performed by: INTERNAL MEDICINE

## 2024-11-04 RX ORDER — SILDENAFIL CITRATE 20 MG/1
TABLET ORAL
Qty: 75 TABLET | Refills: 11 | Status: SHIPPED | OUTPATIENT
Start: 2024-11-04 | End: 2024-11-07 | Stop reason: SDUPTHER

## 2024-11-04 ASSESSMENT — ACTIVITIES OF DAILY LIVING (ADL)
TOILETING: INDEPENDENT
DOING_HOUSEWORK: INDEPENDENT
ADEQUATE_TO_COMPLETE_ADL: YES
GROOMING: INDEPENDENT
PATIENT'S MEMORY ADEQUATE TO SAFELY COMPLETE DAILY ACTIVITIES?: YES
MANAGING_FINANCES: INDEPENDENT
FEEDING YOURSELF: INDEPENDENT
TAKING_MEDICATION: INDEPENDENT
BATHING: INDEPENDENT
GROCERY_SHOPPING: INDEPENDENT
DRESSING: INDEPENDENT
JUDGMENT_ADEQUATE_SAFELY_COMPLETE_DAILY_ACTIVITIES: YES
ASSISTIVE_DEVICE: EYEGLASSES

## 2024-11-04 NOTE — PROGRESS NOTES
"Subjective   Reason for Visit: Robe Fernandez is an 74 y.o. male here for a Medicare Wellness visit.     Past Medical, Surgical, and Family History reviewed and updated in chart.         Here for follow up and wellness visit.  Overall doing well for the most part.    Overall doing well.  Looking forward to going to [place] for the winter in early December  Doing better from the prostate standpoint following his prostate procedure earlier this summer        Patient Care Team:  Berto Ahmadi MD as PCP - General  Berto Ahmadi [facility] P[facility]     Review of Systems    Objective   Vitals:  /78   Pulse 80   Temp 36.3 °C (97.3 °F)   Ht 1.715 m (5' 7.5\")   Wt 86.6 kg (191 lb)   SpO2 94%   BMI 29.47 kg/m²       Physical Exam  Vitals reviewed.   Constitutional:       Appearance: Normal appearance.   HENT:      Head: Normocephalic and atraumatic.   Eyes:      General: No scleral icterus.        Right eye: No discharge.         Left eye: No discharge.      Extraocular Movements: Extraocular movements intact.      Conjunctiva/sclera: Conjunctivae normal.      Pupils: Pupils are equal, round, and reactive to light.   Cardiovascular:      Rate and Rhythm: Normal rate and regular rhythm.      Pulses: Normal pulses.      Heart sounds: Normal heart sounds. No murmur heard.  Pulmonary:      Effort: Pulmonary effort is normal.      Breath sounds: Normal breath sounds. No wheezing or rhonchi.   Musculoskeletal:         General: No deformity or signs of injury. Normal range of motion.      Cervical back: Normal range of motion and neck supple. No rigidity or tenderness.   Lymphadenopathy:      Cervical: No cervical adenopathy.   Skin:     General: Skin is warm and dry.      Findings: No rash.   Neurological:      General: No focal deficit present.      Mental Status: He is alert and oriented to person, place, and time. Mental status is at baseline.      Cranial Nerves: No cranial nerve deficit.      " Sensory: No sensory deficit.      Gait: Gait normal.   Psychiatric:         Mood and Affect: Mood normal.         Behavior: Behavior normal.         Thought Content: Thought content normal.         Judgment: Judgment normal.         Assessment & Plan  Impotence of organic origin    Orders:    sildenafil (Revatio) 20 mg tablet; Use 1-2 daily prn    Routine general medical examination at health care facility    Orders:    1 Year Follow Up In [facility] - PCP - Wellness Exam; Future    Immunization counseling    Orders:    diphth,pertus,acell,,tetanus (BoostRIX) 2.5-8-5 Lf-mcg-Lf/0.5mL injection; Inject 0.5 mL into the muscle 1 time for 1 dose.    Vitamin D deficiency    Orders:    Vitamin D 25-Hydroxy,Total (for eval of Vitamin D levels); Future    Blood pressure elevated without history of HTN    Orders:    CBC; Future    Dyslipidemia, goal LDL below 100    Orders:    Comprehensive Metabolic Panel; Future    TSH with reflex to Free T4 if abnormal; Future    Lipid Panel; Future    Benign prostatic hyperplasia, presence of lower urinary tract symptoms unspecified    Orders:    Prostate Specific Antigen; Future           Portions of this encounter note have been copied from my previous note dated  24 , which have been updated where appropriate and all reflect my current medical decision making from today.     Home situation-he remains independent, living with his  here in [place] during the summer, and spend the winter in [place]. [place] home has 2 stories, w/ bedroom upstairs, and laundry in basement.    Elevated calcium score-1673-May 2023.  He saw Dr. Perez in cardiology.  Statin advanced and the baby aspirin continued.    Family history of abdominal aortic aneurysm-his father  of a ruptured AAA.  His ultrasound in 2023 was normal without evidence of a abdominal aortic aneurysm    Borderline elevated blood pressure- diastolic blood pressure improved on recheck             Blood pressure  presently acceptable     Dyslipidemia/elevated weight-       goal LDL under 70          5/24-LDL favorable at 50.  BMI 29.1.  He will continue healthy lifestyle efforts            11/24-annual spring lipids ordered.  BMI 29.5.  He will continue weight loss efforts through the winter      Exercise routine-he understands the importance of regular exercise.  He typically goes to the gym twice weekly, he enjoys several hikes weekly    Hx fall-he fell  spring '24-when he tripped over a gas station hose at the gas station.  No real injury.  Will defer further evaluation            No recent falls    History of Dizziness-this occurs when he does 1 machine with his  twice weekly with weight training. I encouraged him not to use equipment that causes dizziness             Not actively problematic presently     Seasonal allergies/ recurrent sinus issues- mainly spring issue- better now; saw [facility] ENT MD, and sinus polyp surgery not deemed necessary now. Saw Dr. Whitman - who started shots mainly for molds & weeds- now weekly. He'll work a plan for winter in [place]. He will get back with her accordingly and continue medications              So far doing fairly well this spring despite the heavy pollen counts in [place]. Flonase Sensimist helpful     Colon polyps / History of occasional Loose bowel movements over the last week/history of diverticulosis/family history of colon cancer - father / external hemorrhoids-he will continue his probiotic and healthy diet choices          colonoscopy updated June '21; next in 5 yrs - June 2026    Ext. Hemorrhoids - he'll cont. Fluids, fiber and desitin prn      BPH with  LUTS:  s/p Holmium Laser Enucleation Transurethral Prostate  21312 - [place] LASER ENUCLEATION PROSTATE W/MORCELLATION 6/10/24 per Dr. Perez.             11/24 - improved flow, but some residual symptoms.  Still w/ nocturia. Still drinks timbo fluid to prevent cramping and nightmares. He'll see Dr. Perez back at 6 mo,  spring '25       Elevated PSA  / family Hx prostate cancer - both brothers-           [place] urology evaluation included MRI which was negative.  12/27/21.                          11/23-he will continue to work with his neurologist.  His PSA was stable 8/30/2023 at 5.22              11/24 -annual spring PSA ordered    Erectile dysfunction-            11/24 sildenafil prescription requested/provided         Intention tremor - occas right hand w/ tremor w/ writing. He'll follow     Right lateral epicondylitis- information provided, review two-page handout, he will do stretching and use the Velcro elbow brace and follow-up with concerns.              Not problematic    Right lower lumbar pain/sciatica/right sacroiliac pain-fortunately no recent trauma but clearly uncomfortable.  He received a Medrol pack in urgent care over the weekend.  He will do x-rays and pursue physical therapy.  In the meantime he will try to sleep on his back with his knees elevated avoid other positions while sitting that exacerbates such as avoiding recliner.  He will do sacroiliac stretches as well as Becky exercises             Improved presently       Bilateral CMC DJD- he will continue Tylenol or ibuprofen, caution with overactivity and follow-up with his orthopedist at the [facility] as needed; x-ray suggested mild issue, but clinically occas sore;               doing well    Right shoulder pain-occasionally noted through the years.                             Fortunately not problematic     History of bilateral partial knee replacement-stable symptoms presently. He will see his orthopedist- Dr. Aaron / [facility] as needed. Caution with overactivity and again Tylenol and/or ibuprofen as needed. He also uses glucosamine. So far doing well     Right hip pain-occasionally noted this does not really cause any issues            Mainly flares w/ more frequent exercise or hiking     Metatarsalgia- he has rather high arches and sometimes has pain along  his lateral feet. He will continue to optimize shoes accordingly    Sleep concerns - a bit more noticeable over last 2 yrs or so. He wakes up a few times each week. Discussed sleep related age changes.         Skin care-he understands importance of sunscreen and dermatology follow-up with concerns. Presently on doxycycline for presumed rosacea. Visits annually. Several spots removed.             Dermatology appointment planned 8/24- no concerns     Dental care-encouraged semiannual dental visits- UTD     Glasses / Vision care-he continues regularly- He will set this up soon.            Cataracts advancing. He'll cont. Each spring here in [place]            Vision exam updated early May 2024    Hearing concern-audiology evaluation ordered 5/24          Updated at [facility] - all fine        Flu shot encouraged each fall- updated 9/11/24     Covid booster -updated 9/11/24    RSV- 8/24    Tdap booster-suggested 5/24- he'll check on dates    Shingrix completed 8/24     Follow-up in 6 months, sooner as needed -after returning from [place]        Charting was completed using voice recognition technology and may include unintended errors.

## 2024-11-05 NOTE — ASSESSMENT & PLAN NOTE
Orders:    Comprehensive Metabolic Panel; Future    TSH with reflex to Free T4 if abnormal; Future    Lipid Panel; Future

## 2024-11-07 DIAGNOSIS — N52.9 IMPOTENCE OF ORGANIC ORIGIN: ICD-10-CM

## 2024-11-07 RX ORDER — SILDENAFIL CITRATE 20 MG/1
TABLET ORAL
Qty: 75 TABLET | Refills: 11 | Status: SHIPPED | OUTPATIENT
Start: 2024-11-07

## 2024-11-13 ENCOUNTER — APPOINTMENT (OUTPATIENT)
Dept: PRIMARY CARE | Facility: CLINIC | Age: 74
End: 2024-11-13
Payer: MEDICARE

## 2025-03-26 ENCOUNTER — APPOINTMENT (OUTPATIENT)
Dept: UROLOGY | Facility: HOSPITAL | Age: 75
End: 2025-03-26
Payer: MEDICARE

## 2025-04-02 ENCOUNTER — APPOINTMENT (OUTPATIENT)
Dept: UROLOGY | Facility: HOSPITAL | Age: 75
End: 2025-04-02
Payer: MEDICARE

## 2025-04-09 NOTE — PROGRESS NOTES
HPI    74 y.o. male being seen with the following problem list:    Problem list:  BPH, both obstructive and irritative symptoms, s/p HoLEP + botox 100U 6/10/24. Path 60g, benign.     6/12/24 - seen for TOV, passed easily     6/26/24 - seen today for 2 week symptom check. Strong stream, emptying well. Reports significant improvement in his UUI, although still having very minimal urge leakage. Overall, happy with where he is at.     09/26/24 - PVR 5cc. Doing well, urinary symptoms have improved significantly from before surgery. Reports rare episodes of frequency and urgency that come and go, really not that bothersome, happens very rarely.  No incontinence.  Very happy with his outcome.     04/10/25 - Seen today over telehealth, performed this visit using real-time telehealth tools, including an audio/video connection between Robe Fernandez at home and Ry Perez MD at [facility]. Consent for telemedicine visit was obtained.   Doing well, urinating well. Urgency is better, does not wait too long when he needs to go. No concerns.       Lab Results   Component Value Date    PSA 5.22 (H) 08/30/2023    PSA 6.09 (H) 04/25/2023    PSA 6.60 (H) 11/15/2021    PSA 5.10 (H) 11/01/2021    PSA 3.39 06/27/2020              Current Medications:  Current Outpatient Medications   Medication Sig Dispense Refill    acetaminophen (Tylenol) 325 mg tablet Take 2 tablets (650 mg) by mouth every 6 hours if needed for mild pain (1 - 3). 30 tablet 0    aspirin 81 mg EC tablet Take 1 tablet (81 mg) by mouth once daily.      atorvastatin (Lipitor) 40 mg tablet Take 1 tablet (40 mg) by mouth once daily at bedtime. For cholesterol 90 tablet 3    azelastine 205.5 mcg (0.15 %) spray,non-aerosol Administer 2 sprays into affected nostril(s) once daily.      docusate sodium (Colace) 50 mg capsule Take 2 capsules (100 mg) by mouth 2 times a day.      fexofenadine (Allegra) 180 mg tablet Take by mouth.      fluticasone (Flonase Sensimist)  27.5 mcg/actuation nasal spray Administer into affected nostril(s).      glucosamine sanchez 2KCl-chondroit 500-400 mg tablet Take 1 tablet by mouth once daily.      ibuprofen 600 mg tablet Take 1 tablet (600 mg) by mouth every 6 hours if needed for mild pain (1 - 3). 30 tablet 0    Lactobacillus acidophilus 500 million cell tablet Take 1 tablet by mouth if needed.      multivitamin tablet Take 1 tablet by mouth once daily.      phenazopyridine (Pyridium) 200 mg tablet Take 1 tablet (200 mg) by mouth 3 times a day as needed for bladder spasms. 10 tablet 0    polyethylene glycol (Glycolax, Miralax) 17 gram packet Take 17 g by mouth once daily. 10 packet 0    sildenafil (Revatio) 20 mg tablet Use 1-2 daily prn 75 tablet 11    vitamins A,C,E-zinc-copper (ICaps AREDS) 7,160-113-100 unit-mg-unit tablet,delayed release (DR/EC) Take by mouth.       No current facility-administered medications for this visit.        Active Problems:  Robe Fernandez is a 74 y.o. male with the following Problems and Medications.  Patient Active Problem List   Diagnosis    Allergic rhinitis due to pollen    Benign prostatic hyperplasia, presence of lower urinary tract symptoms unspecified    Blood pressure elevated without history of HTN    Diverticulosis of colon    Dyslipidemia, goal LDL below 100    Elevated PSA    Hesitancy of micturition    Osteoarthritis of first carpometacarpal joint, unspecified    Tremor, essential    Weak urinary stream    Wears glasses    Inflammatory spondylopathy, unspecified spinal region    Arthritis of both knees    Impotence of organic origin    Agatston coronary artery calcium score greater than 400    OAB (overactive bladder)    Benign prostatic hyperplasia with urinary frequency    Benign prostatic hyperplasia with urinary obstruction    Urge incontinence    Urinary urgency     Current Outpatient Medications   Medication Sig Dispense Refill    acetaminophen (Tylenol) 325 mg tablet Take 2 tablets (650 mg) by mouth  every 6 hours if needed for mild pain (1 - 3). 30 tablet 0    aspirin 81 mg EC tablet Take 1 tablet (81 mg) by mouth once daily.      atorvastatin (Lipitor) 40 mg tablet Take 1 tablet (40 mg) by mouth once daily at bedtime. For cholesterol 90 tablet 3    azelastine 205.5 mcg (0.15 %) spray,non-aerosol Administer 2 sprays into affected nostril(s) once daily.      docusate sodium (Colace) 50 mg capsule Take 2 capsules (100 mg) by mouth 2 times a day.      fexofenadine (Allegra) 180 mg tablet Take by mouth.      fluticasone (Flonase Sensimist) 27.5 mcg/actuation nasal spray Administer into affected nostril(s).      glucosamine sanchez 2KCl-chondroit 500-400 mg tablet Take 1 tablet by mouth once daily.      ibuprofen 600 mg tablet Take 1 tablet (600 mg) by mouth every 6 hours if needed for mild pain (1 - 3). 30 tablet 0    Lactobacillus acidophilus 500 million cell tablet Take 1 tablet by mouth if needed.      multivitamin tablet Take 1 tablet by mouth once daily.      phenazopyridine (Pyridium) 200 mg tablet Take 1 tablet (200 mg) by mouth 3 times a day as needed for bladder spasms. 10 tablet 0    polyethylene glycol (Glycolax, Miralax) 17 gram packet Take 17 g by mouth once daily. 10 packet 0    sildenafil (Revatio) 20 mg tablet Use 1-2 daily prn 75 tablet 11    vitamins A,C,E-zinc-copper (ICaps AREDS) 7,160-113-100 unit-mg-unit tablet,delayed release (DR/EC) Take by mouth.       No current facility-administered medications for this visit.       PMH:  Past Medical History:   Diagnosis Date    Allergic rhinitis     BPH (benign prostatic hyperplasia)     Coronary artery disease     8/2023: CT calcium score 1673 saw Dr. Perez, no stress test needed d/t very active.  Had AAA screening d/t family history which was negative, no further testing needed.    Diverticulosis of colon     Elevated BP without diagnosis of hypertension     home BPs WNL    Elevated PSA     Hyperlipidemia     OAB (overactive bladder)     Osteoarthritis      Tremor, essential     resolved per pt       PSH:  Past Surgical History:   Procedure Laterality Date    COLONOSCOPY      KNEE SURGERY Bilateral     partial replacement    TONSILLECTOMY      WRIST SURGERY Left     lunate bone surgery       FMH:  Family History   Problem Relation Name Age of Onset    Other (natural causes) Mother           in 80s    Transient ischemic attack Father      Stroke Father          in     Aortic aneurysm Father           in mid 80s    Arthritis Sister      Prostate cancer Brother      Prostate cancer Brother         SHx:  Social History     Tobacco Use    Smoking status: Never    Smokeless tobacco: Never   Vaping Use    Vaping status: Never Used   Substance Use Topics    Alcohol use: Yes     Comment: once/month    Drug use: Never       Allergies:  No Known Allergies    Assessment/Plan  Urinary well, without any issues or concerns. Doesn't wait too long between voids.     FU in 1 year over .     Scribe Attestation  By signing my name below, I, Renato Payan, attest that this documentation has been prepared under the direction and in the presence of Ry Perez MD.

## 2025-04-10 ENCOUNTER — TELEMEDICINE (OUTPATIENT)
Dept: UROLOGY | Facility: HOSPITAL | Age: 75
End: 2025-04-10
Payer: MEDICARE

## 2025-04-10 DIAGNOSIS — R35.0 BENIGN PROSTATIC HYPERPLASIA WITH URINARY FREQUENCY: ICD-10-CM

## 2025-04-10 DIAGNOSIS — N40.1 BENIGN PROSTATIC HYPERPLASIA WITH URINARY FREQUENCY: ICD-10-CM

## 2025-04-10 DIAGNOSIS — N32.81 OAB (OVERACTIVE BLADDER): Primary | ICD-10-CM

## 2025-04-10 PROCEDURE — G2211 COMPLEX E/M VISIT ADD ON: HCPCS | Performed by: UROLOGY

## 2025-04-10 PROCEDURE — 1123F ACP DISCUSS/DSCN MKR DOCD: CPT | Performed by: UROLOGY

## 2025-04-10 PROCEDURE — 99213 OFFICE O/P EST LOW 20 MIN: CPT | Performed by: UROLOGY

## 2025-05-28 ENCOUNTER — APPOINTMENT (OUTPATIENT)
Dept: PRIMARY CARE | Facility: CLINIC | Age: 75
End: 2025-05-28
Payer: MEDICARE

## 2025-07-20 LAB
25(OH)D3+25(OH)D2 SERPL-MCNC: 36 NG/ML (ref 30–100)
ALBUMIN SERPL-MCNC: 4.6 G/DL (ref 3.6–5.1)
ALP SERPL-CCNC: 93 U/L (ref 35–144)
ALT SERPL-CCNC: 16 U/L (ref 9–46)
ANION GAP SERPL CALCULATED.4IONS-SCNC: 7 MMOL/L (CALC) (ref 7–17)
AST SERPL-CCNC: 20 U/L (ref 10–35)
BILIRUB SERPL-MCNC: 0.7 MG/DL (ref 0.2–1.2)
BUN SERPL-MCNC: 15 MG/DL (ref 7–25)
CALCIUM SERPL-MCNC: 9.8 MG/DL (ref 8.6–10.3)
CHLORIDE SERPL-SCNC: 102 MMOL/L (ref 98–110)
CHOLEST SERPL-MCNC: 118 MG/DL
CHOLEST/HDLC SERPL: 2.4 (CALC)
CO2 SERPL-SCNC: 28 MMOL/L (ref 20–32)
CREAT SERPL-MCNC: 0.86 MG/DL (ref 0.7–1.28)
EGFRCR SERPLBLD CKD-EPI 2021: 92 ML/MIN/1.73M2
ERYTHROCYTE [DISTWIDTH] IN BLOOD BY AUTOMATED COUNT: 13.6 % (ref 11–15)
GLUCOSE SERPL-MCNC: 98 MG/DL (ref 65–99)
HCT VFR BLD AUTO: 50.9 % (ref 38.5–50)
HDLC SERPL-MCNC: 50 MG/DL
HGB BLD-MCNC: 16.2 G/DL (ref 13.2–17.1)
LDLC SERPL CALC-MCNC: 50 MG/DL (CALC)
MCH RBC QN AUTO: 28.4 PG (ref 27–33)
MCHC RBC AUTO-ENTMCNC: 31.8 G/DL (ref 32–36)
MCV RBC AUTO: 89.3 FL (ref 80–100)
NONHDLC SERPL-MCNC: 68 MG/DL (CALC)
PLATELET # BLD AUTO: 232 THOUSAND/UL (ref 140–400)
PMV BLD REES-ECKER: 11.3 FL (ref 7.5–12.5)
POTASSIUM SERPL-SCNC: 4.9 MMOL/L (ref 3.5–5.3)
PROT SERPL-MCNC: 6.4 G/DL (ref 6.1–8.1)
PSA SERPL-MCNC: 0.75 NG/ML
RBC # BLD AUTO: 5.7 MILLION/UL (ref 4.2–5.8)
SODIUM SERPL-SCNC: 137 MMOL/L (ref 135–146)
TRIGL SERPL-MCNC: 92 MG/DL
TSH SERPL-ACNC: 1.33 MIU/L (ref 0.4–4.5)
WBC # BLD AUTO: 4.9 THOUSAND/UL (ref 3.8–10.8)

## 2025-07-22 ENCOUNTER — APPOINTMENT (OUTPATIENT)
Dept: PRIMARY CARE | Facility: CLINIC | Age: 75
End: 2025-07-22
Payer: MEDICARE

## 2025-07-22 VITALS
BODY MASS INDEX: 29.63 KG/M2 | OXYGEN SATURATION: 94 % | SYSTOLIC BLOOD PRESSURE: 108 MMHG | DIASTOLIC BLOOD PRESSURE: 72 MMHG | WEIGHT: 192 LBS | HEART RATE: 62 BPM

## 2025-07-22 DIAGNOSIS — Z23 IMMUNIZATION DUE: ICD-10-CM

## 2025-07-22 DIAGNOSIS — J30.1 SEASONAL ALLERGIC RHINITIS DUE TO POLLEN: ICD-10-CM

## 2025-07-22 DIAGNOSIS — R35.0 BENIGN PROSTATIC HYPERPLASIA WITH URINARY FREQUENCY: ICD-10-CM

## 2025-07-22 DIAGNOSIS — E78.5 DYSLIPIDEMIA, GOAL LDL BELOW 100: Primary | ICD-10-CM

## 2025-07-22 DIAGNOSIS — N40.1 BENIGN PROSTATIC HYPERPLASIA WITH URINARY FREQUENCY: ICD-10-CM

## 2025-07-22 DIAGNOSIS — Z00.00 ROUTINE GENERAL MEDICAL EXAMINATION AT HEALTH CARE FACILITY: ICD-10-CM

## 2025-07-22 DIAGNOSIS — E55.9 VITAMIN D DEFICIENCY: ICD-10-CM

## 2025-07-22 DIAGNOSIS — M17.0 ARTHRITIS OF BOTH KNEES: ICD-10-CM

## 2025-07-22 DIAGNOSIS — N40.0 BENIGN PROSTATIC HYPERPLASIA, PRESENCE OF LOWER URINARY TRACT SYMPTOMS UNSPECIFIED: ICD-10-CM

## 2025-07-22 PROCEDURE — 1160F RVW MEDS BY RX/DR IN RCRD: CPT | Performed by: INTERNAL MEDICINE

## 2025-07-22 PROCEDURE — 1159F MED LIST DOCD IN RCRD: CPT | Performed by: INTERNAL MEDICINE

## 2025-07-22 PROCEDURE — 99214 OFFICE O/P EST MOD 30 MIN: CPT | Performed by: INTERNAL MEDICINE

## 2025-07-22 PROCEDURE — G2211 COMPLEX E/M VISIT ADD ON: HCPCS | Performed by: INTERNAL MEDICINE

## 2025-07-22 RX ORDER — PNEUMOCOCCAL 20-VALENT CONJUGATE VACCINE 2.2; 2.2; 2.2; 2.2; 2.2; 2.2; 2.2; 2.2; 2.2; 2.2; 2.2; 2.2; 2.2; 2.2; 2.2; 2.2; 4.4; 2.2; 2.2; 2.2 UG/.5ML; UG/.5ML; UG/.5ML; UG/.5ML; UG/.5ML; UG/.5ML; UG/.5ML; UG/.5ML; UG/.5ML; UG/.5ML; UG/.5ML; UG/.5ML; UG/.5ML; UG/.5ML; UG/.5ML; UG/.5ML; UG/.5ML; UG/.5ML; UG/.5ML; UG/.5ML
0.5 INJECTION, SUSPENSION INTRAMUSCULAR ONCE
Qty: 0.5 ML | Refills: 0 | Status: SHIPPED | OUTPATIENT
Start: 2025-07-22 | End: 2025-07-22

## 2025-07-22 NOTE — PROGRESS NOTES
Subjective   Patient ID: Robe Fernandez is a 74 y.o. male who presents for Follow-up.    Here for 6-month follow-up  Overall doing well.  Recently did labs.  [place].  It was extremely educational.  He did well with all of the logistics         Review of Systems    Objective   /72 (BP Location: Right arm, Patient Position: Sitting, BP Cuff Size: Large adult)   Pulse 62   Wt 87.1 kg (192 lb)   SpO2 94%   BMI 29.63 kg/m²     Physical Exam  Vitals reviewed.   Constitutional:       Appearance: Normal appearance.   HENT:      Head: Normocephalic and atraumatic.     Eyes:      General: No scleral icterus.        Right eye: No discharge.         Left eye: No discharge.      Extraocular Movements: Extraocular movements intact.      Conjunctiva/sclera: Conjunctivae normal.      Pupils: Pupils are equal, round, and reactive to light.       Cardiovascular:      Rate and Rhythm: Normal rate and regular rhythm.      Pulses: Normal pulses.      Heart sounds: Normal heart sounds. No murmur heard.  Pulmonary:      Effort: Pulmonary effort is normal.      Breath sounds: Normal breath sounds. No wheezing or rhonchi.     Musculoskeletal:         General: No deformity or signs of injury. Normal range of motion.      Cervical back: Normal range of motion and neck supple. No rigidity or tenderness.   Lymphadenopathy:      Cervical: No cervical adenopathy.     Skin:     General: Skin is warm and dry.      Findings: No rash.     Neurological:      General: No focal deficit present.      Mental Status: He is alert and oriented to person, place, and time. Mental status is at baseline.      Cranial Nerves: No cranial nerve deficit.      Sensory: No sensory deficit.      Gait: Gait normal.     Psychiatric:         Mood and Affect: Mood normal.         Behavior: Behavior normal.         Thought Content: Thought content normal.         Judgment: Judgment normal.          Assessment/Plan   Problem List Items Addressed This Visit           ICD-10-CM    Allergic rhinitis due to pollen J30.1    Benign prostatic hyperplasia, presence of lower urinary tract symptoms unspecified N40.0    Dyslipidemia, goal LDL below 100 - Primary E78.5    Arthritis of both knees M17.0    Benign prostatic hyperplasia with urinary frequency N40.1, R35.0     Other Visit Diagnoses         Codes      Routine general medical examination at health care facility     Z00.00      Vitamin D deficiency     E55.9      Immunization due     Z23            Portions of this encounter note have been copied from my previous note dated  24 , which have been updated where appropriate and all reflect my current medical decision making from today.       Labs reviewed from 2025    We spoke for over 30 minutes over half the time counseling    Care planning-his  was with him today to help with details and history        Home situation-he remains independent, living with his  here in [place] during the summer, and spend the winter in [place]. [place] home has 2 stories, w/ bedroom upstairs, and laundry in basement.    Elevated calcium score-1673-May 2023.  He saw Dr. Perez in cardiology.  Statin advanced and the baby aspirin continued.            -he continues healthy lifestyle measures including diet, medication with baby aspirin and statin, and exercise    Family history of abdominal aortic aneurysm-his father  of a ruptured AAA.  His ultrasound in 2023 was normal without evidence of a abdominal aortic aneurysm    Borderline elevated blood pressure- diastolic blood pressure improved on recheck           -blood pressure presently quite good with systolic at 108.     Dyslipidemia/elevated weight-       goal LDL under 70          -LDL favorable at 50.  BMI 29.1.  He will continue healthy lifestyle efforts            -annual spring lipids ordered.  BMI 29.5.  He will continue  weight loss efforts through the winter               7/25-tolerating moderate dose atorvastatin 40 mg daily-BMI 29.6.  LDL 50      Exercise routine-he understands the importance of regular exercise.  He typically goes to the gym twice weekly, he enjoys several hikes weekly    Hx fall-he fell  spring '24-when he tripped over a gas station hose at the gas station.  No real injury.  Will defer further evaluation            No recent falls    History of Dizziness-this occurs when he does 1 machine with his  twice weekly with weight training. I encouraged him not to use equipment that causes dizziness             Not actively problematic presently     Seasonal allergies/ recurrent sinus issues- mainly spring issue- better now; saw [facility] ENT MD, and sinus polyp surgery not deemed necessary now. Saw Dr. Whitman - who started shots mainly for molds & weeds- now weekly. He'll work a plan for winter in [place]. He will get back with her accordingly and continue medications              So far doing fairly well this spring despite the heavy pollen counts in [place]. Flonase Sensimist helpful.  Astelin and Allegra as needed also helpful     Colon polyps / History of occasional Loose bowel movements over the last week/history of diverticulosis/family history of colon cancer - father / external hemorrhoids-he will continue his probiotic and healthy diet choices          colonoscopy updated June '21; next in 5 yrs - June 2026    Ext. Hemorrhoids - he'll cont. Fluids, fiber and desitin prn.  No recent concerns      BPH with  LUTS:  s/p Holmium Laser Enucleation Transurethral Prostate  86360 - SC LASER ENUCLEATION PROSTATE W/MORCELLATION 6/10/24 per Dr. Perez.             11/24 - improved flow, but some residual symptoms.  Still w/ nocturia. Still drinks timbo fluid to prevent cramping and nightmares. He'll see Dr. Perez back at 6 mo, spring '25            7/25-PSA normal       Elevated PSA  / family Hx prostate cancer - both  brothers-           [place] urology evaluation included MRI which was negative.  12/27/21.                          11/23-he will continue to work with his neurologist.  His PSA was stable 8/30/2023 at 5.22              11/24 -annual spring PSA ordered                7/25-PSA normal    Erectile dysfunction-            11/24 sildenafil prescription requested/provided         Intention tremor - occas right hand w/ tremor w/ writing. He'll follow     Right lateral epicondylitis- information provided, review two-page handout, he will do stretching and use the Velcro elbow brace and follow-up with concerns.              Not problematic    Right lower lumbar pain/sciatica/right sacroiliac pain-fortunately no recent trauma but clearly uncomfortable.  He received a Medrol pack in urgent care over the weekend.  He will do x-rays and pursue physical therapy.  In the meantime he will try to sleep on his back with his knees elevated avoid other positions while sitting that exacerbates such as avoiding recliner.  He will do sacroiliac stretches as well as Becky exercises             Improved presently       Bilateral CMC DJD- he will continue Tylenol or ibuprofen, caution with overactivity and follow-up with his orthopedist at the [facility] as needed; x-ray suggested mild issue, but clinically occas sore;               doing well    Right shoulder pain-occasionally noted through the years.                             Fortunately not problematic     History of bilateral partial knee replacement-stable symptoms presently. He will see his orthopedist- Dr. Aaron / [facility] as needed. Caution with overactivity and again Tylenol and/or ibuprofen as needed. He also uses glucosamine. So far doing well     Right hip pain-occasionally noted this does not really cause any issues            Mainly flares w/ more frequent exercise or hiking     Metatarsalgia- he has rather high arches and sometimes has pain along his lateral feet. He will continue  to optimize shoes accordingly    Sleep concerns - a bit more noticeable over last 2 yrs or so. He wakes up a few times each week. Discussed sleep related age changes.         Skin care-he understands importance of sunscreen and dermatology follow-up with concerns. Presently on doxycycline for presumed rosacea. Visits annually. Several spots removed.             Dermatology appointment planned 8/24- no concerns     Dental care-encouraged semiannual dental visits- UTD     Glasses / Vision care-he continues regularly- He will set this up soon.            Cataracts advancing. He'll cont. Each spring here in [place]            Vision exam updated early May 2024    Hearing concern-audiology evaluation ordered 5/24          Updated at [facility] - all fine      Travel experiences-           7/25-Enjoyed [place] with a [employer] program, focusing on archaeology recently.  It was extremely educational.  He did well with all of the logistics        Flu shot encouraged each fall- updated 9/11/24     Covid booster encouraged each fall--updated 9/11/24    Prevnar 20-suggested 7/25    RSV- 8/24    Tdap booster-updated 5/23    Shingrix completed 8/24     Follow-up as scheduled this November for his wellness visit, sooner as needed       Charting was completed using voice recognition technology and may include unintended errors.

## 2025-07-31 ENCOUNTER — APPOINTMENT (OUTPATIENT)
Dept: PRIMARY CARE | Facility: CLINIC | Age: 75
End: 2025-07-31
Payer: MEDICARE

## 2025-11-06 ENCOUNTER — APPOINTMENT (OUTPATIENT)
Dept: PRIMARY CARE | Facility: CLINIC | Age: 75
End: 2025-11-06
Payer: MEDICARE

## 2026-04-22 ENCOUNTER — APPOINTMENT (OUTPATIENT)
Dept: PRIMARY CARE | Facility: CLINIC | Age: 76
End: 2026-04-22
Payer: MEDICARE

## (undated) DEVICE — CATHETER, LASER URETERAL, 7.1FR, 40CM

## (undated) DEVICE — ADAPTER, Y TUBING STERILE

## (undated) DEVICE — CATHETER, URETHRAL, FOLEY, 3 WAY, BARDEX IC, 20 FR, 30 CC, SILVER, LATEX

## (undated) DEVICE — TUBING, SUCTION, CONNECTING, NON-CONDUCTIVE, SURE GRIP CONNECTORS, 3/16 IN X 10 FT

## (undated) DEVICE — CATHETER, SECURE DEVICE, URINARY, ADH

## (undated) DEVICE — IRRIGATION KIT, TURP, Y, LARGE BORE, 81 IN

## (undated) DEVICE — COLLECTION BAG, FLUID, NON-STERILE

## (undated) DEVICE — TUBING, CLEAR N-COND, 5MM X 10, LF

## (undated) DEVICE — SYRINGE, 50 CC, LUER LOCK

## (undated) DEVICE — BAG, DRAINAGE, ANTI-REFLUX CHAMBER, 2000ML

## (undated) DEVICE — NEEDLE, INJETAK ADJUSTABLE TIP

## (undated) DEVICE — SYRINGE, 10 CC, LUER LOCK

## (undated) DEVICE — Device

## (undated) DEVICE — EVACUATOR, UROVAC

## (undated) DEVICE — BLADE, ROTATION MORCELLATOR, 4.8MM X 385MM, PIRHANA, DISPOSABLE

## (undated) DEVICE — IRRIGATION SET, CYSTOSCOPY, TURP, Y, CONTINUOUS, 81 IN

## (undated) DEVICE — TUBING, MORCELLATOR PUMP, DISPOSABLE

## (undated) DEVICE — SYRINGE, 60 CC, IRRIGATION, PISTON, CATH TIP, W/LUER ADAPTER,DISP

## (undated) DEVICE — CATHETER, FOLEY, 3WAY, 22FR, 30CC, LUBRI-SIL, LF

## (undated) DEVICE — PLUG, CATHETER

## (undated) DEVICE — TOWEL PACK, STERILE, 4/PACK, BLUE